# Patient Record
Sex: FEMALE | Race: ASIAN | NOT HISPANIC OR LATINO
[De-identification: names, ages, dates, MRNs, and addresses within clinical notes are randomized per-mention and may not be internally consistent; named-entity substitution may affect disease eponyms.]

---

## 2017-01-26 ENCOUNTER — RESULT REVIEW (OUTPATIENT)
Age: 67
End: 2017-01-26

## 2021-10-04 ENCOUNTER — APPOINTMENT (OUTPATIENT)
Dept: SURGERY | Facility: CLINIC | Age: 71
End: 2021-10-04
Payer: MEDICARE

## 2021-10-04 VITALS
SYSTOLIC BLOOD PRESSURE: 126 MMHG | HEART RATE: 58 BPM | TEMPERATURE: 97.6 F | HEIGHT: 62 IN | WEIGHT: 118 LBS | BODY MASS INDEX: 21.71 KG/M2 | DIASTOLIC BLOOD PRESSURE: 77 MMHG

## 2021-10-04 DIAGNOSIS — T81.89XA OTHER COMPLICATIONS OF PROCEDURES, NOT ELSEWHERE CLASSIFIED, INITIAL ENCOUNTER: ICD-10-CM

## 2021-10-04 PROCEDURE — 99202 OFFICE O/P NEW SF 15 MIN: CPT

## 2021-10-04 NOTE — HISTORY OF PRESENT ILLNESS
[de-identified] : 70 yo female with h/o lap randy several years ago as well as laps BSO presents today with c/o foul smelling drainage from umbilicus. She states surrounding skin becomes irritated as well and she has been placed on Keflex in the past. She also c/o some periumbilical discomfort at times.

## 2021-10-04 NOTE — PHYSICAL EXAM
[Respiratory Effort] : normal respiratory effort [Alert] : alert [Oriented to Person] : oriented to person [Oriented to Place] : oriented to place [Oriented to Time] : oriented to time [Calm] : calm [JVD] : no jugular venous distention  [Abdominal Masses] : No abdominal masses [Abdomen Tenderness] : ~T ~M No abdominal tenderness [de-identified] : EVIE YOO NAD [de-identified] : EOMI [de-identified] : soft NT ND + suture granuloma palpated deep to umbilicus

## 2021-10-04 NOTE — REASON FOR VISIT
[Consultation] : a consultation visit [Spouse] : spouse [FreeTextEntry1] : intermittent drainage from umbilicus

## 2021-10-04 NOTE — ASSESSMENT
[FreeTextEntry1] : 70 yo female with a symptomatic suture granuloma. Will schedule for out patient exploration and removal of suture.

## 2021-10-19 ENCOUNTER — OUTPATIENT (OUTPATIENT)
Dept: OUTPATIENT SERVICES | Facility: HOSPITAL | Age: 71
LOS: 1 days | Discharge: HOME | End: 2021-10-19
Payer: MEDICARE

## 2021-10-19 VITALS
SYSTOLIC BLOOD PRESSURE: 115 MMHG | DIASTOLIC BLOOD PRESSURE: 70 MMHG | TEMPERATURE: 98 F | RESPIRATION RATE: 16 BRPM | OXYGEN SATURATION: 99 % | HEART RATE: 75 BPM | HEIGHT: 62 IN | WEIGHT: 115.96 LBS

## 2021-10-19 DIAGNOSIS — I10 ESSENTIAL (PRIMARY) HYPERTENSION: ICD-10-CM

## 2021-10-19 DIAGNOSIS — Z90.721 ACQUIRED ABSENCE OF OVARIES, UNILATERAL: Chronic | ICD-10-CM

## 2021-10-19 DIAGNOSIS — T81.89XA OTHER COMPLICATIONS OF PROCEDURES, NOT ELSEWHERE CLASSIFIED, INITIAL ENCOUNTER: ICD-10-CM

## 2021-10-19 DIAGNOSIS — Z98.890 OTHER SPECIFIED POSTPROCEDURAL STATES: Chronic | ICD-10-CM

## 2021-10-19 DIAGNOSIS — E78.5 HYPERLIPIDEMIA, UNSPECIFIED: ICD-10-CM

## 2021-10-19 DIAGNOSIS — H40.9 UNSPECIFIED GLAUCOMA: ICD-10-CM

## 2021-10-19 DIAGNOSIS — Z01.818 ENCOUNTER FOR OTHER PREPROCEDURAL EXAMINATION: ICD-10-CM

## 2021-10-19 LAB
ANION GAP SERPL CALC-SCNC: 5 MMOL/L — SIGNIFICANT CHANGE UP (ref 5–17)
BUN SERPL-MCNC: 11 MG/DL — SIGNIFICANT CHANGE UP (ref 7–23)
CALCIUM SERPL-MCNC: 9.5 MG/DL — SIGNIFICANT CHANGE UP (ref 8.5–10.1)
CHLORIDE SERPL-SCNC: 107 MMOL/L — SIGNIFICANT CHANGE UP (ref 96–108)
CO2 SERPL-SCNC: 27 MMOL/L — SIGNIFICANT CHANGE UP (ref 22–31)
CREAT SERPL-MCNC: 0.84 MG/DL — SIGNIFICANT CHANGE UP (ref 0.5–1.3)
GLUCOSE SERPL-MCNC: 104 MG/DL — HIGH (ref 70–99)
HCT VFR BLD CALC: 38.3 % — SIGNIFICANT CHANGE UP (ref 34.5–45)
HGB BLD-MCNC: 12.4 G/DL — SIGNIFICANT CHANGE UP (ref 11.5–15.5)
MCHC RBC-ENTMCNC: 30.1 PG — SIGNIFICANT CHANGE UP (ref 27–34)
MCHC RBC-ENTMCNC: 32.4 GM/DL — SIGNIFICANT CHANGE UP (ref 32–36)
MCV RBC AUTO: 93 FL — SIGNIFICANT CHANGE UP (ref 80–100)
NRBC # BLD: 0 /100 WBCS — SIGNIFICANT CHANGE UP (ref 0–0)
PLATELET # BLD AUTO: 170 K/UL — SIGNIFICANT CHANGE UP (ref 150–400)
POTASSIUM SERPL-MCNC: 4.4 MMOL/L — SIGNIFICANT CHANGE UP (ref 3.5–5.3)
POTASSIUM SERPL-SCNC: 4.4 MMOL/L — SIGNIFICANT CHANGE UP (ref 3.5–5.3)
RBC # BLD: 4.12 M/UL — SIGNIFICANT CHANGE UP (ref 3.8–5.2)
RBC # FLD: 13.5 % — SIGNIFICANT CHANGE UP (ref 10.3–14.5)
SODIUM SERPL-SCNC: 139 MMOL/L — SIGNIFICANT CHANGE UP (ref 135–145)
WBC # BLD: 4.89 K/UL — SIGNIFICANT CHANGE UP (ref 3.8–10.5)
WBC # FLD AUTO: 4.89 K/UL — SIGNIFICANT CHANGE UP (ref 3.8–10.5)

## 2021-10-19 PROCEDURE — 93010 ELECTROCARDIOGRAM REPORT: CPT

## 2021-10-19 RX ORDER — SODIUM CHLORIDE 9 MG/ML
3 INJECTION INTRAMUSCULAR; INTRAVENOUS; SUBCUTANEOUS EVERY 8 HOURS
Refills: 0 | Status: DISCONTINUED | OUTPATIENT
Start: 2021-10-28 | End: 2021-10-28

## 2021-10-19 NOTE — H&P PST ADULT - PROBLEM SELECTOR PLAN 1
Excision of umbilical suture granuloma  Pre-op instructions given by PA, patient verbalized understanding  Chlorhexidine wash instructions given   medical clearance

## 2021-10-19 NOTE — H&P PST ADULT - PROBLEM/PLAN-1
Clear bilaterally, pupils equal, round and reactive to light. +left upper lid swelling and redness DISPLAY PLAN FREE TEXT

## 2021-10-19 NOTE — H&P PST ADULT - NSICDXFAMILYHX_GEN_ALL_CORE_FT
FAMILY HISTORY:  Father  Still living? No  Family history of asthma, Age at diagnosis: Age Unknown    Mother  Still living? No  Family history of liver cancer, Age at diagnosis: Age Unknown

## 2021-10-19 NOTE — H&P PST ADULT - NSICDXPASTMEDICALHX_GEN_ALL_CORE_FT
PAST MEDICAL HISTORY:  Bunion     Glaucoma     Hypercholesteremia     Hypertension     Osteoporosis     Wrist fracture, right s/p fall 1/13

## 2021-10-19 NOTE — H&P PST ADULT - ASSESSMENT
71 F with PMHx of HTN, HLD, glaucoma, osteoporosis, scoliosis c/o abdominal pain and foul smelling discharge from umbilicus (treated with keflex) found to have complication of procedure here for PST for scheduled excision of umbilical suture granuloma  CAPRINI SCORE    AGE RELATED RISK FACTORS                                                       MOBILITY RELATED FACTORS  [ ] Age 41-60 years                                            (1 Point)                  [ ] Bed rest                                                        (1 Point)  [x ] Age: 61-74 years                                           (2 Points)                [ ] Plaster cast                                                   (2 Points)  [ ] Age= 75 years                                              (3 Points)                 [ ] Bed bound for more than 72 hours                   (2 Points)    DISEASE RELATED RISK FACTORS                                               GENDER SPECIFIC FACTORS  [ ] Edema in the lower extremities                       (1 Point)                  [ ] Pregnancy                                                     (1 Point)  [ ] Varicose veins                                               (1 Point)                  [ ] Post-partum < 6 weeks                                   (1 Point)             [ ] BMI > 25 Kg/m2                                            (1 Point)                  [ ] Hormonal therapy  or oral contraception            (1 Point)                 [ ] Sepsis (in the previous month)                        (1 Point)                  [ ] History of pregnancy complications  [ ] Pneumonia or serious lung disease                                               [ ] Unexplained or recurrent                       (1 Point)           (in the previous month)                               (1 Point)  [ ] Abnormal pulmonary function test                     (1 Point)                 SURGERY RELATED RISK FACTORS  [ ] Acute myocardial infarction                              (1 Point)                 [ ]  Section                                            (1 Point)  [ ] Congestive heart failure (in the previous month)  (1 Point)                 [ ] Minor surgery                                                 (1 Point)   [ ] Inflammatory bowel disease                             (1 Point)                 [ ] Arthroscopic surgery                                        (2 Points)  [ ] Central venous access                                    (2 Points)                [ x] General surgery lasting more than 45 minutes   (2 Points)       [ ] Stroke (in the previous month)                          (5 Points)               [ ] Elective arthroplasty                                        (5 Points)                                                                                                                                               HEMATOLOGY RELATED FACTORS                                                 TRAUMA RELATED RISK FACTORS  [ ] Prior episodes of VTE                                     (3 Points)                 [ ] Fracture of the hip, pelvis, or leg                       (5 Points)  [ ] Positive family history for VTE                         (3 Points)                 [ ] Acute spinal cord injury (in the previous month)  (5 Points)  [ ] Prothrombin 81344 A                                      (3 Points)                 [ ] Paralysis  (less than 1 month)                          (5 Points)  [ ] Factor V Leiden                                             (3 Points)                 [ ] Multiple Trauma within 1 month                         (5 Points)  [ ] Lupus anticoagulants                                     (3 Points)                                                           [ ] Anticardiolipin antibodies                                (3 Points)                                                       [ ] High homocysteine in the blood                      (3 Points)                                             [ ] Other congenital or acquired thrombophilia       (3 Points)                                                [ ] Heparin induced thrombocytopenia                  (3 Points)                                          Total Score [      4  ]

## 2021-10-19 NOTE — H&P PST ADULT - NSICDXPASTSURGICALHX_GEN_ALL_CORE_FT
PAST SURGICAL HISTORY:  Bunion     S/P cholecystectomy     S/P removal of left ovary     S/P removal of right ovary

## 2021-10-19 NOTE — H&P PST ADULT - HISTORY OF PRESENT ILLNESS
63yo F with PMHx of HTN, HLD,  71 F with PMHx of HTN, HLD, glaucoma, osteoporosis, scoliosis c/o abdominal pain and foul smelling discharge from umbilicus (treated with keflex) found to have complication of procedure here for PST for scheduled excision of umbilical suture granuloma    this patient denies any fever, cough, sob, flu like symptoms or travel outside of the [place] in the past 30 days  COVID vaccination series completed

## 2021-10-24 DIAGNOSIS — Z01.818 ENCOUNTER FOR OTHER PREPROCEDURAL EXAMINATION: ICD-10-CM

## 2021-10-25 ENCOUNTER — APPOINTMENT (OUTPATIENT)
Dept: DISASTER EMERGENCY | Facility: CLINIC | Age: 71
End: 2021-10-25

## 2021-10-26 LAB — SARS-COV-2 N GENE NPH QL NAA+PROBE: NOT DETECTED

## 2021-10-27 ENCOUNTER — TRANSCRIPTION ENCOUNTER (OUTPATIENT)
Age: 71
End: 2021-10-27

## 2021-10-28 ENCOUNTER — APPOINTMENT (OUTPATIENT)
Dept: SURGERY | Facility: HOSPITAL | Age: 71
End: 2021-10-28

## 2021-10-28 ENCOUNTER — OUTPATIENT (OUTPATIENT)
Dept: OUTPATIENT SERVICES | Facility: HOSPITAL | Age: 71
LOS: 1 days | Discharge: HOME | End: 2021-10-28
Payer: MEDICARE

## 2021-10-28 VITALS
OXYGEN SATURATION: 99 % | SYSTOLIC BLOOD PRESSURE: 131 MMHG | HEART RATE: 66 BPM | HEIGHT: 62 IN | DIASTOLIC BLOOD PRESSURE: 77 MMHG | TEMPERATURE: 98 F | RESPIRATION RATE: 18 BRPM | WEIGHT: 115.96 LBS

## 2021-10-28 VITALS
RESPIRATION RATE: 16 BRPM | HEART RATE: 62 BPM | OXYGEN SATURATION: 98 % | SYSTOLIC BLOOD PRESSURE: 137 MMHG | DIASTOLIC BLOOD PRESSURE: 71 MMHG

## 2021-10-28 DIAGNOSIS — Z90.721 ACQUIRED ABSENCE OF OVARIES, UNILATERAL: Chronic | ICD-10-CM

## 2021-10-28 PROCEDURE — 15851 REMOVAL SUTR/STAPLE REQ ANES: CPT | Mod: GC

## 2021-10-28 PROCEDURE — 49250 EXCISION OF UMBILICUS: CPT | Mod: GC

## 2021-10-28 RX ORDER — FENTANYL CITRATE 50 UG/ML
25 INJECTION INTRAVENOUS
Refills: 0 | Status: DISCONTINUED | OUTPATIENT
Start: 2021-10-28 | End: 2021-10-28

## 2021-10-28 RX ORDER — LATANOPROST 0.05 MG/ML
1 SOLUTION/ DROPS OPHTHALMIC; TOPICAL
Qty: 0 | Refills: 0 | DISCHARGE

## 2021-10-28 RX ORDER — METOCLOPRAMIDE HCL 10 MG
10 TABLET ORAL ONCE
Refills: 0 | Status: DISCONTINUED | OUTPATIENT
Start: 2021-10-28 | End: 2021-10-28

## 2021-10-28 RX ORDER — SODIUM CHLORIDE 9 MG/ML
1000 INJECTION, SOLUTION INTRAVENOUS
Refills: 0 | Status: DISCONTINUED | OUTPATIENT
Start: 2021-10-28 | End: 2021-10-28

## 2021-10-28 RX ADMIN — FENTANYL CITRATE 25 MICROGRAM(S): 50 INJECTION INTRAVENOUS at 16:10

## 2021-10-28 RX ADMIN — SODIUM CHLORIDE 65 MILLILITER(S): 9 INJECTION, SOLUTION INTRAVENOUS at 15:18

## 2021-10-28 RX ADMIN — FENTANYL CITRATE 25 MICROGRAM(S): 50 INJECTION INTRAVENOUS at 16:25

## 2021-10-28 NOTE — BRIEF OPERATIVE NOTE - NSICDXBRIEFPROCEDURE_GEN_ALL_CORE_FT
PROCEDURES:  Excision, excess skin, abdomen 28-Oct-2021 15:20:29 scar tissue excision in the umbilicus Wellington Morales

## 2021-10-28 NOTE — ASU DISCHARGE PLAN (ADULT/PEDIATRIC) - ASU DC SPECIAL INSTRUCTIONSFT
Please follow up with Dr. Vargas in 2 weeks. Please call his office to make/confirm your appointment    Please take Tylenol 1000mg q6h and Motrin 400mg q6h, but alternate it so that you're taking pain medication every 3 hours.    Please remove the outer dressing after 72 hours

## 2021-10-28 NOTE — ASU DISCHARGE PLAN (ADULT/PEDIATRIC) - CALL YOUR DOCTOR IF YOU HAVE ANY OF THE FOLLOWING:
Bleeding that does not stop/Swelling that gets worse/Excessive diarrhea/Inability to tolerate liquids or foods

## 2021-10-28 NOTE — ASU DISCHARGE PLAN (ADULT/PEDIATRIC) - CARE PROVIDER_API CALL
Jose Guadalupe Vargas)  ColonRectal Surgery; Surgery  [address] [place] [address]  Phone: [phone]  Fax: [phone]  Follow Up Time:

## 2021-11-04 DIAGNOSIS — T81.9XXA UNSPECIFIED COMPLICATION OF PROCEDURE, INITIAL ENCOUNTER: ICD-10-CM

## 2021-11-04 DIAGNOSIS — I10 ESSENTIAL (PRIMARY) HYPERTENSION: ICD-10-CM
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
## 2021-11-04 DIAGNOSIS — M81.0 AGE-RELATED OSTEOPOROSIS WITHOUT CURRENT PATHOLOGICAL FRACTURE: ICD-10-CM

## 2021-11-04 DIAGNOSIS — E78.00 PURE HYPERCHOLESTEROLEMIA, UNSPECIFIED: ICD-10-CM

## 2021-11-15 ENCOUNTER — APPOINTMENT (OUTPATIENT)
Dept: SURGERY | Facility: CLINIC | Age: 71
End: 2021-11-15
Payer: MEDICARE

## 2021-11-15 VITALS
DIASTOLIC BLOOD PRESSURE: 76 MMHG | BODY MASS INDEX: 21.71 KG/M2 | SYSTOLIC BLOOD PRESSURE: 125 MMHG | HEIGHT: 62 IN | HEART RATE: 61 BPM | TEMPERATURE: 97.6 F | WEIGHT: 118 LBS

## 2021-11-15 DIAGNOSIS — Z09 ENCOUNTER FOR FOLLOW-UP EXAMINATION AFTER COMPLETED TREATMENT FOR CONDITIONS OTHER THAN MALIGNANT NEOPLASM: ICD-10-CM

## 2021-11-15 PROCEDURE — 99024 POSTOP FOLLOW-UP VISIT: CPT

## 2021-11-15 NOTE — ASSESSMENT
[FreeTextEntry1] : patient is well, states drainage has stopped.\par \par \par \par \par incision is c/d/i healing well\par \par \par \par f/u prn

## 2022-03-10 ENCOUNTER — INPATIENT (INPATIENT)
Facility: HOSPITAL | Age: 72
LOS: 1 days | Discharge: HOME | End: 2022-03-12
Attending: HOSPITALIST | Admitting: HOSPITALIST
Payer: MEDICARE

## 2022-03-10 VITALS
HEIGHT: 62 IN | DIASTOLIC BLOOD PRESSURE: 70 MMHG | TEMPERATURE: 98 F | SYSTOLIC BLOOD PRESSURE: 136 MMHG | OXYGEN SATURATION: 100 % | RESPIRATION RATE: 18 BRPM | HEART RATE: 61 BPM

## 2022-03-10 DIAGNOSIS — Z90.721 ACQUIRED ABSENCE OF OVARIES, UNILATERAL: Chronic | ICD-10-CM

## 2022-03-10 LAB
ALBUMIN SERPL ELPH-MCNC: 4.5 G/DL — SIGNIFICANT CHANGE UP (ref 3.3–5)
ALP SERPL-CCNC: 39 U/L — LOW (ref 40–120)
ALT FLD-CCNC: 15 U/L — SIGNIFICANT CHANGE UP (ref 4–33)
ANION GAP SERPL CALC-SCNC: 12 MMOL/L — SIGNIFICANT CHANGE UP (ref 7–14)
APPEARANCE UR: CLEAR — SIGNIFICANT CHANGE UP
AST SERPL-CCNC: 27 U/L — SIGNIFICANT CHANGE UP (ref 4–32)
BASOPHILS # BLD AUTO: 0.02 K/UL — SIGNIFICANT CHANGE UP (ref 0–0.2)
BASOPHILS NFR BLD AUTO: 0.4 % — SIGNIFICANT CHANGE UP (ref 0–2)
BILIRUB SERPL-MCNC: 0.4 MG/DL — SIGNIFICANT CHANGE UP (ref 0.2–1.2)
BILIRUB UR-MCNC: NEGATIVE — SIGNIFICANT CHANGE UP
BUN SERPL-MCNC: 9 MG/DL — SIGNIFICANT CHANGE UP (ref 7–23)
CALCIUM SERPL-MCNC: 9.5 MG/DL — SIGNIFICANT CHANGE UP (ref 8.4–10.5)
CHLORIDE SERPL-SCNC: 107 MMOL/L — SIGNIFICANT CHANGE UP (ref 98–107)
CO2 SERPL-SCNC: 24 MMOL/L — SIGNIFICANT CHANGE UP (ref 22–31)
COLOR SPEC: COLORLESS — SIGNIFICANT CHANGE UP
CREAT SERPL-MCNC: 0.77 MG/DL — SIGNIFICANT CHANGE UP (ref 0.5–1.3)
DIFF PNL FLD: NEGATIVE — SIGNIFICANT CHANGE UP
EGFR: 82 ML/MIN/1.73M2 — SIGNIFICANT CHANGE UP
EOSINOPHIL # BLD AUTO: 0.03 K/UL — SIGNIFICANT CHANGE UP (ref 0–0.5)
EOSINOPHIL NFR BLD AUTO: 0.6 % — SIGNIFICANT CHANGE UP (ref 0–6)
FLUAV AG NPH QL: SIGNIFICANT CHANGE UP
FLUBV AG NPH QL: SIGNIFICANT CHANGE UP
GLUCOSE SERPL-MCNC: 92 MG/DL — SIGNIFICANT CHANGE UP (ref 70–99)
GLUCOSE UR QL: NEGATIVE — SIGNIFICANT CHANGE UP
HCT VFR BLD CALC: 35.7 % — SIGNIFICANT CHANGE UP (ref 34.5–45)
HGB BLD-MCNC: 11.8 G/DL — SIGNIFICANT CHANGE UP (ref 11.5–15.5)
IANC: 2.91 K/UL — SIGNIFICANT CHANGE UP (ref 1.5–8.5)
IMM GRANULOCYTES NFR BLD AUTO: 0.2 % — SIGNIFICANT CHANGE UP (ref 0–1.5)
KETONES UR-MCNC: NEGATIVE — SIGNIFICANT CHANGE UP
LEUKOCYTE ESTERASE UR-ACNC: NEGATIVE — SIGNIFICANT CHANGE UP
LYMPHOCYTES # BLD AUTO: 1.45 K/UL — SIGNIFICANT CHANGE UP (ref 1–3.3)
LYMPHOCYTES # BLD AUTO: 30.9 % — SIGNIFICANT CHANGE UP (ref 13–44)
MCHC RBC-ENTMCNC: 30.9 PG — SIGNIFICANT CHANGE UP (ref 27–34)
MCHC RBC-ENTMCNC: 33.1 GM/DL — SIGNIFICANT CHANGE UP (ref 32–36)
MCV RBC AUTO: 93.5 FL — SIGNIFICANT CHANGE UP (ref 80–100)
MONOCYTES # BLD AUTO: 0.28 K/UL — SIGNIFICANT CHANGE UP (ref 0–0.9)
MONOCYTES NFR BLD AUTO: 6 % — SIGNIFICANT CHANGE UP (ref 2–14)
NEUTROPHILS # BLD AUTO: 2.91 K/UL — SIGNIFICANT CHANGE UP (ref 1.8–7.4)
NEUTROPHILS NFR BLD AUTO: 61.9 % — SIGNIFICANT CHANGE UP (ref 43–77)
NITRITE UR-MCNC: NEGATIVE — SIGNIFICANT CHANGE UP
NRBC # BLD: 0 /100 WBCS — SIGNIFICANT CHANGE UP
NRBC # FLD: 0 K/UL — SIGNIFICANT CHANGE UP
PH UR: 6.5 — SIGNIFICANT CHANGE UP (ref 5–8)
PLATELET # BLD AUTO: 162 K/UL — SIGNIFICANT CHANGE UP (ref 150–400)
POTASSIUM SERPL-MCNC: 4.3 MMOL/L — SIGNIFICANT CHANGE UP (ref 3.5–5.3)
POTASSIUM SERPL-SCNC: 4.3 MMOL/L — SIGNIFICANT CHANGE UP (ref 3.5–5.3)
PROT SERPL-MCNC: 7 G/DL — SIGNIFICANT CHANGE UP (ref 6–8.3)
PROT UR-MCNC: NEGATIVE — SIGNIFICANT CHANGE UP
RBC # BLD: 3.82 M/UL — SIGNIFICANT CHANGE UP (ref 3.8–5.2)
RBC # FLD: 13.6 % — SIGNIFICANT CHANGE UP (ref 10.3–14.5)
RSV RNA NPH QL NAA+NON-PROBE: SIGNIFICANT CHANGE UP
SARS-COV-2 RNA SPEC QL NAA+PROBE: SIGNIFICANT CHANGE UP
SODIUM SERPL-SCNC: 143 MMOL/L — SIGNIFICANT CHANGE UP (ref 135–145)
SP GR SPEC: 1.01 — SIGNIFICANT CHANGE UP (ref 1–1.05)
UROBILINOGEN FLD QL: SIGNIFICANT CHANGE UP
WBC # BLD: 4.7 K/UL — SIGNIFICANT CHANGE UP (ref 3.8–10.5)
WBC # FLD AUTO: 4.7 K/UL — SIGNIFICANT CHANGE UP (ref 3.8–10.5)

## 2022-03-10 PROCEDURE — 72100 X-RAY EXAM L-S SPINE 2/3 VWS: CPT | Mod: 26

## 2022-03-10 PROCEDURE — 73502 X-RAY EXAM HIP UNI 2-3 VIEWS: CPT | Mod: 26,RT

## 2022-03-10 PROCEDURE — 99285 EMERGENCY DEPT VISIT HI MDM: CPT

## 2022-03-10 PROCEDURE — 74177 CT ABD & PELVIS W/CONTRAST: CPT | Mod: 26,MA

## 2022-03-10 RX ORDER — FAMOTIDINE 10 MG/ML
20 INJECTION INTRAVENOUS ONCE
Refills: 0 | Status: COMPLETED | OUTPATIENT
Start: 2022-03-10 | End: 2022-03-10

## 2022-03-10 RX ORDER — SODIUM CHLORIDE 9 MG/ML
1000 INJECTION INTRAMUSCULAR; INTRAVENOUS; SUBCUTANEOUS ONCE
Refills: 0 | Status: COMPLETED | OUTPATIENT
Start: 2022-03-10 | End: 2022-03-10

## 2022-03-10 RX ORDER — OXYCODONE HYDROCHLORIDE 5 MG/1
5 TABLET ORAL ONCE
Refills: 0 | Status: DISCONTINUED | OUTPATIENT
Start: 2022-03-10 | End: 2022-03-10

## 2022-03-10 RX ORDER — LIDOCAINE 4 G/100G
1 CREAM TOPICAL ONCE
Refills: 0 | Status: COMPLETED | OUTPATIENT
Start: 2022-03-10 | End: 2022-03-10

## 2022-03-10 RX ORDER — KETOROLAC TROMETHAMINE 30 MG/ML
15 SYRINGE (ML) INJECTION ONCE
Refills: 0 | Status: DISCONTINUED | OUTPATIENT
Start: 2022-03-10 | End: 2022-03-10

## 2022-03-10 RX ADMIN — SODIUM CHLORIDE 1000 MILLILITER(S): 9 INJECTION INTRAMUSCULAR; INTRAVENOUS; SUBCUTANEOUS at 19:19

## 2022-03-10 RX ADMIN — FAMOTIDINE 20 MILLIGRAM(S): 10 INJECTION INTRAVENOUS at 15:47

## 2022-03-10 RX ADMIN — OXYCODONE HYDROCHLORIDE 5 MILLIGRAM(S): 5 TABLET ORAL at 17:58

## 2022-03-10 RX ADMIN — OXYCODONE HYDROCHLORIDE 5 MILLIGRAM(S): 5 TABLET ORAL at 22:39

## 2022-03-10 RX ADMIN — LIDOCAINE 1 PATCH: 4 CREAM TOPICAL at 15:47

## 2022-03-10 RX ADMIN — Medication 15 MILLIGRAM(S): at 15:47

## 2022-03-10 NOTE — ED PROVIDER NOTE - CLINICAL SUMMARY MEDICAL DECISION MAKING FREE TEXT BOX
72 y/o F c/o right hip/flank pain x 4 days, now radiating into abdomen and down right leg, a/w dysuria x 1 day. Order CBC/CMP, UA, and UCx to r/o UTI/pyelo. Right hip XR and LSpine XR to r/o fractures/herniations. Pain control with toradol, pepcid, and lidocaine patches. Will continue to monitor and reassess following labs/imaging.

## 2022-03-10 NOTE — ED PROVIDER NOTE - PROGRESS NOTE DETAILS
Supervising Statement (CONNOR Aguirre, PA-C): I have personally seen and examined this patient.  I have fully participated in the care of this patient. I have reviewed all pertinent clinical information, including history, physical exam, plan and PA student’s note and agree as noted. NILESH Aguirre: Pt reassessed, still has significant pain despite pain meds. Labs/ua unremarkable. Plan to do ct abdomen to r/o intra abdominal pathology, if negative plan for CDU for MRI L spine/hip to eval atraumatic hip pain Patient able to ambulate slowly with some pain. Will get CT hip to look for occult fracture. CDU vs Admit. Pebbles Conway PGY1: I received sign out on this patient. I have reassessed patient and agree with the current plan. Will follow-up labs/imaging. Pebbles Conway MD PGY1: Patient able to ambulate slowly with some pain. Will get CT hip to look for occult fracture. CDU vs Admit. CT A/P read right inguinal fat containing hernia, pateint was reexamined. No signs of erythema, pain, or bulging on exam. Do not suspect incarnated hernia. Unlikely source of patient's pain. Patient points to back of left buttocks as site of worst pain, worse with palpation. Patient also with pain on internal and external rotation at the hip. Pebbles Conway MD PGY1: CT tech states back up in list for patients to scanner. Patient still has several scans ahead of her. Patient remains asleep in bed. Gave 500 mL fluids due to soft BP. Will reassess after fluids.

## 2022-03-10 NOTE — ED PROVIDER NOTE - ATTENDING CONTRIBUTION TO CARE
Randy Carpenter DO:  patient seen and evaluated with the PA and student.  I was present for key portions of the History & Physical, and I agree with the Impression & Plan. 70 yo f pmh HTN, HLD, glaucoma, osteoporosis, scoliosis, pw bp. reports r hip pain, radiates down leg and across ant abd, constant, worse w/ movement. denies f/c, cp, sob, cough, congestion ha. denies hx malignancy, trauma, iv drug use, bowel/bladder incontinence, saddle anesthesia. reports she is under a lot of stress and delayed this visit because taking care of  w/ AML. pt arrives hds, well appearing, ambulatory. neuro intact. mild pain in r hip. do not suspect fx/dislocation. unlikely acute abd surgical emergency. abd soft. suspect disc herniation. do not suspect central cord, cauda equina. will check labs, urine, treat sx, reassess. Randy Carpenter DO:  patient seen and evaluated with the PA and student.  I was present for key portions of the History & Physical, and I agree with the Impression & Plan. 70 yo f pmh HTN, HLD, glaucoma, osteoporosis, scoliosis, pw bp. reports r hip pain, radiates down leg and across ant abd, constant, worse w/ movement. denies f/c, cp, sob, cough, congestion ha. denies hx malignancy, trauma, iv drug use, bowel/bladder incontinence, saddle anesthesia. reports she is under a lot of stress and delayed this visit because taking care of  w/ AML. pt arrives hds, well appearing, ambulatory. neuro intact. mild pain in r hip. do not suspect fx/dislocation. unlikely acute abd surgical emergency. abd soft. suspect disc herniation. do not suspect central cord, cauda equina. will check labs, urine, treat sx, reassess..

## 2022-03-10 NOTE — ED ADULT TRIAGE NOTE - CHIEF COMPLAINT QUOTE
Pt c/o right hip pain x4 days that got worse last night.  Denies trauma.  Hx HTN HLD glaucoma.  Taking a muscle relaxer with no relief.

## 2022-03-10 NOTE — ED PROVIDER NOTE - CONSTITUTIONAL, MLM
normal... Apparent distress secondary to pain. Awake, alert, oriented to person, place, time/situation

## 2022-03-10 NOTE — ED PROVIDER NOTE - MUSCULOSKELETAL MINIMAL EXAM
+posterior R hip tenderness to palpation, +pain with axial load, no pain with ROM, +pain in R flank +posterior R hip tenderness to palpation, + hip pain with axial load, no pain with hip ROM/RANGE OF MOTION LIMITED/neck supple/motor intact/TENDERNESS

## 2022-03-10 NOTE — ED ADULT NURSE NOTE - OBJECTIVE STATEMENT
71 year old female received to rm 26 AAOx4 ambulatory at baseline. Pt c/o right flank and pelvic pain x4 days intermittent. Pain worsened this morning and became constant. Pt unable to ambulate today due to pain. Pt endorses burning with urination. Pt taking tylenol, bengay and muscle relaxer without relief. Pt denies headache, dizziness, chest pain, n/v/d, fever, chills. Respirations even and unlabored. Skin intact. PIV #20 right AC, labs drawn and sent. UA sent. VS as noted. Bed in lowest position, call bell within reach. Pt awaiting XR results.

## 2022-03-10 NOTE — ED PROVIDER NOTE - ABDOMINAL EXAM
+suprapubic tenderness to palpation, no guarding or rebound tenderness, no CVA tenderness, soft, non-distended soft/tender.../nondistended

## 2022-03-10 NOTE — ED PROVIDER NOTE - OBJECTIVE STATEMENT
70 y/o F PMH HTN, HLD, glaucoma, osteoporosis, scoliosis presents with worsening, constant, 10/10 atraumatic R hip pain x 4 days. Today pain started radiating down leg and across anterior abdomen, and is worse with movement. She reports using tizanidine, tylenol, bengay and heating pads with no relief. Pt reports sitting in a chair for a prolonged period of time when pain first began, worsening it. Pt also notes dysuria since yesterday. Denies fever, chills, HA, CP, SOB, N/V/D, constipation, hematuria, extremity numbness/weakness/tingling, bowel/bladder incontinence, saddle anesthesia, hx of previous hip fx, or malignancy. 72 y/o F PMH HTN, HLD, glaucoma, osteoporosis, scoliosis presents with worsening, constant, 10/10 atraumatic R hip pain x 4 days. Today pain started radiating down leg and across anterior abdomen, and is worse with movement. She reports using tizanidine, tylenol, bengay and heating pads with no relief. Pt reports sitting in a chair for a prolonged period of time when pain first began, worsening it. Pt also notes dysuria since yesterday. Denies fever, chills, HA, CP, SOB, N/V/D, constipation, hematuria, extremity numbness/weakness/tingling, bowel/bladder incontinence, saddle anesthesia, hx of previous hip fx, or malignancy. O fnote, patient has been caring for her  who has been diagnosed w/ acute leukemia, but denies strenuous activity.

## 2022-03-10 NOTE — ED PROVIDER NOTE - NSFOLLOWUPINSTRUCTIONS_ED_ALL_ED_FT
We've got you covered from head to toe    Our specialty programs:    Sp[facility]  [phone]  [email or web address]    [facility]  [phone]  [email or web address]    Sports Program  [phone]  [email or web address]    Concussion Program  [phone]  [email or web address]     (Scheduling) team hours of operation:  Monday through Thursday, 7am to 8:30pm  Friday, 7am to 7pm  Saturday, 8am to 4pm    ARTHRITIS   WHAT YOU NEED TO KNOW:  Arthritis is pain or disease in one or more joints. There are many types of arthritis. Types such as rheumatoid arthritis cause inflammation in the joints. Other types wear away the cartilage between joints, such as osteoarthritis. This makes the bones of the joint rub together when you move the joint. An infection from bacteria, a virus, or a fungus can also cause arthritis. Your symptoms may be constant, or symptoms may come and go. Arthritis often gets worse over time and can cause permanent joint damage.  DISCHARGE INSTRUCTIONS:  Call your doctor or rheumatologist if:   •You have a fever and severe joint pain or swelling.  •You cannot move the affected joint.  •You have severe joint pain you cannot tolerate.  •You have a new or worsening rash.  •Your pain or swelling does not get better with treatment.  •You have questions or concerns about your condition or care.  Medicines:   •Acetaminophen decreases pain and fever. It is available without a doctor's order. Ask how much to take and how often to take it. Follow directions. Read the labels of all other medicines you are using to see if they also contain acetaminophen, or ask your doctor or pharmacist. Acetaminophen can cause liver damage if not taken correctly. Do not use more than 4 grams (4,000 milligrams) total of acetaminophen in one day.   •NSAIDs, such as ibuprofen, help decrease swelling, pain, and fever. This medicine is available with or without a doctor's order. NSAIDs can cause stomach bleeding or kidney problems in certain people. If you take blood thinner medicine, always ask your healthcare provider if NSAIDs are safe for you. Always read the medicine label and follow directions  •Steroids reduce swelling and pain.  •Prescription pain medicine may be given. Ask your healthcare provider how to take this medicine safely. Some prescription pain medicines contain acetaminophen. Do not take other medicines that contain acetaminophen without talking to your healthcare provider. Too much acetaminophen may cause liver damage. Prescription pain medicine may cause constipation. Ask your healthcare provider how to prevent or treat constipation.   •Take your medicine as directed. Contact your healthcare provider if you think your medicine is not helping or if you have side effects. Tell him of her if you are allergic to any medicine. Keep a list of the medicines, vitamins, and herbs you take. Include the amounts, and when and why you take them. Bring the list or the pill bottles to follow-up visits. Carry your medicine list with you in case of an emergency.  MANAGE YOUR SYMPTOMS:   •Rest your painful joint so it can heal. Your healthcare provider may recommend crutches or a walker if the affected joint is in a leg.  •Apply ice or heat to the joint. Both can help decrease swelling and pain. Ice may also help prevent tissue damage. Use an ice pack, or put crushed ice in a plastic bag. Cover it with a towel and place it on your joint for 15 to 20 minutes every hour or as directed. You can apply heat for 20 minutes every 2 hours. Heat treatment includes hot packs or heat lamps.  •Elevate your joint. Elevation helps reduce swelling and pain. Raise your joint above the level of your heart as often as you can. Prop your painful joint on pillows to keep it above your heart comfortably.  Elevate Leg  MANAGE ARTHRITIS  •Talk to your healthcare providers about your arthritis medicines. Some medicines may only be needed when you have arthritis pain. You may need to take other medicines every day to prevent arthritis from getting worse. Your healthcare providers will help you understand all your medicines and when to take them. It is important to take the medicines as directed, even if you start to feel better. You can continue to have joint damage and inflammation even if you do not feel it.  •Eat a variety of healthy foods. Healthy foods include fruits, vegetables, whole-grain breads, low-fat dairy products, beans, lean meats, and fish. Ask if you need to be on a special diet. A diet rich in calcium and vitamin D may decrease your risk of osteoporosis. Foods high in calcium include milk, cheese, broccoli, and tofu. Vitamin D may be found in meat, fish, fortified milk, cereal and bread. Ask if you need calcium or vitamin D supplements.   •Go to physical or occupational therapy as directed. A physical therapist can teach you exercises to improve flexibility and range of motion. You may also be shown non-weight-bearing exercises that are safe for your joints, such as swimming. Exercise can help keep your joints flexible and reduce pain. An occupational therapist can help you learn to do your daily activities when your joints are stiff or sore.  •Maintain a healthy weight. Extra weight puts increased pressure on your joints. Ask your healthcare provider what you should weigh. If you need to lose weight, he or she can help you create a weight loss program. Weight loss can help reduce pain and increase your ability to do your activities.  •Wear flat or low-heeled shoes. This will help decrease pain and reduce pressure on your ankle, knee, and hip joints.  •Do not smoke. Nicotine and other chemicals in cigarettes and cigars can damage your bones and joints. Ask your healthcare provider for information if you currently smoke and need help to quit. E-cigarettes or smokeless tobacco still contain nicotine. Talk to your healthcare provider before you use these products.   Support devices:   •Orthotic shoes or insoles help support your feet when you walk.  •Crutches, a cane, or a walker may help decrease your risk for falling. They also decrease stress on affected joints.  •Devices to prevent falls include raised toilet seats and bathtub bars to help you get up from sitting. Handrails can be placed in areas where you need balance and support.  •Devices to help with support and rest include splints to wear on your hands and a firm pillow while you sleep. Use a pillow that is firm enough to support your neck and head.  Follow up with your healthcare provider or rheumatologist as directed: Write down your questions so you remember to ask them during your visits.

## 2022-03-11 DIAGNOSIS — M81.0 AGE-RELATED OSTEOPOROSIS WITHOUT CURRENT PATHOLOGICAL FRACTURE: ICD-10-CM

## 2022-03-11 DIAGNOSIS — Z29.9 ENCOUNTER FOR PROPHYLACTIC MEASURES, UNSPECIFIED: ICD-10-CM

## 2022-03-11 DIAGNOSIS — I10 ESSENTIAL (PRIMARY) HYPERTENSION: ICD-10-CM

## 2022-03-11 DIAGNOSIS — M25.551 PAIN IN RIGHT HIP: ICD-10-CM

## 2022-03-11 DIAGNOSIS — H40.9 UNSPECIFIED GLAUCOMA: ICD-10-CM

## 2022-03-11 DIAGNOSIS — K21.9 GASTRO-ESOPHAGEAL REFLUX DISEASE WITHOUT ESOPHAGITIS: ICD-10-CM

## 2022-03-11 DIAGNOSIS — E78.5 HYPERLIPIDEMIA, UNSPECIFIED: ICD-10-CM

## 2022-03-11 LAB
BASE EXCESS BLDV CALC-SCNC: -1.2 MMOL/L — SIGNIFICANT CHANGE UP (ref -2–3)
BLOOD GAS VENOUS COMPREHENSIVE RESULT: SIGNIFICANT CHANGE UP
CHLORIDE BLDV-SCNC: 107 MMOL/L — SIGNIFICANT CHANGE UP (ref 96–108)
CO2 BLDV-SCNC: 24.8 MMOL/L — SIGNIFICANT CHANGE UP (ref 22–26)
CULTURE RESULTS: SIGNIFICANT CHANGE UP
GAS PNL BLDV: 140 MMOL/L — SIGNIFICANT CHANGE UP (ref 136–145)
GLUCOSE BLDV-MCNC: 77 MG/DL — SIGNIFICANT CHANGE UP (ref 70–99)
HCO3 BLDV-SCNC: 24 MMOL/L — SIGNIFICANT CHANGE UP (ref 22–29)
HCT VFR BLDA CALC: 32 % — LOW (ref 34.5–46.5)
HGB BLD CALC-MCNC: 10.7 G/DL — LOW (ref 11.5–15.5)
LACTATE BLDV-MCNC: 0.8 MMOL/L — SIGNIFICANT CHANGE UP (ref 0.5–2)
PCO2 BLDV: 39 MMHG — SIGNIFICANT CHANGE UP (ref 39–42)
PH BLDV: 7.39 — SIGNIFICANT CHANGE UP (ref 7.32–7.43)
PO2 BLDV: 52 MMHG — SIGNIFICANT CHANGE UP
POTASSIUM BLDV-SCNC: 4 MMOL/L — SIGNIFICANT CHANGE UP (ref 3.5–5.1)
SAO2 % BLDV: 86.5 % — SIGNIFICANT CHANGE UP
SPECIMEN SOURCE: SIGNIFICANT CHANGE UP

## 2022-03-11 PROCEDURE — 99223 1ST HOSP IP/OBS HIGH 75: CPT

## 2022-03-11 PROCEDURE — 76376 3D RENDER W/INTRP POSTPROCES: CPT | Mod: 26,MA

## 2022-03-11 PROCEDURE — 73700 CT LOWER EXTREMITY W/O DYE: CPT | Mod: 26,RT,MA

## 2022-03-11 PROCEDURE — 72192 CT PELVIS W/O DYE: CPT | Mod: 26,MA

## 2022-03-11 RX ORDER — PANTOPRAZOLE SODIUM 20 MG/1
40 TABLET, DELAYED RELEASE ORAL
Refills: 0 | Status: DISCONTINUED | OUTPATIENT
Start: 2022-03-11 | End: 2022-03-12

## 2022-03-11 RX ORDER — LOSARTAN POTASSIUM 100 MG/1
50 TABLET, FILM COATED ORAL DAILY
Refills: 0 | Status: DISCONTINUED | OUTPATIENT
Start: 2022-03-11 | End: 2022-03-12

## 2022-03-11 RX ORDER — ASCORBIC ACID 60 MG
500 TABLET,CHEWABLE ORAL DAILY
Refills: 0 | Status: DISCONTINUED | OUTPATIENT
Start: 2022-03-11 | End: 2022-03-12

## 2022-03-11 RX ORDER — ATORVASTATIN CALCIUM 80 MG/1
20 TABLET, FILM COATED ORAL AT BEDTIME
Refills: 0 | Status: DISCONTINUED | OUTPATIENT
Start: 2022-03-11 | End: 2022-03-12

## 2022-03-11 RX ORDER — ONDANSETRON 8 MG/1
4 TABLET, FILM COATED ORAL EVERY 8 HOURS
Refills: 0 | Status: DISCONTINUED | OUTPATIENT
Start: 2022-03-11 | End: 2022-03-12

## 2022-03-11 RX ORDER — L.ACIDOPH/B.ANIMALIS/B.LONGUM 15B CELL
1 CAPSULE ORAL
Qty: 0 | Refills: 0 | DISCHARGE

## 2022-03-11 RX ORDER — LATANOPROST 0.05 MG/ML
1 SOLUTION/ DROPS OPHTHALMIC; TOPICAL AT BEDTIME
Refills: 0 | Status: DISCONTINUED | OUTPATIENT
Start: 2022-03-11 | End: 2022-03-12

## 2022-03-11 RX ORDER — SODIUM CHLORIDE 9 MG/ML
500 INJECTION INTRAMUSCULAR; INTRAVENOUS; SUBCUTANEOUS ONCE
Refills: 0 | Status: COMPLETED | OUTPATIENT
Start: 2022-03-11 | End: 2022-03-11

## 2022-03-11 RX ORDER — ACETAMINOPHEN 500 MG
975 TABLET ORAL ONCE
Refills: 0 | Status: COMPLETED | OUTPATIENT
Start: 2022-03-11 | End: 2022-03-11

## 2022-03-11 RX ORDER — INFLUENZA VIRUS VACCINE 15; 15; 15; 15 UG/.5ML; UG/.5ML; UG/.5ML; UG/.5ML
0.7 SUSPENSION INTRAMUSCULAR ONCE
Refills: 0 | Status: DISCONTINUED | OUTPATIENT
Start: 2022-03-11 | End: 2022-03-12

## 2022-03-11 RX ORDER — ACETAMINOPHEN 500 MG
650 TABLET ORAL EVERY 6 HOURS
Refills: 0 | Status: DISCONTINUED | OUTPATIENT
Start: 2022-03-11 | End: 2022-03-12

## 2022-03-11 RX ORDER — TIMOLOL 0.5 %
1 DROPS OPHTHALMIC (EYE) DAILY
Refills: 0 | Status: DISCONTINUED | OUTPATIENT
Start: 2022-03-11 | End: 2022-03-12

## 2022-03-11 RX ORDER — ONDANSETRON 8 MG/1
4 TABLET, FILM COATED ORAL ONCE
Refills: 0 | Status: COMPLETED | OUTPATIENT
Start: 2022-03-11 | End: 2022-03-11

## 2022-03-11 RX ORDER — OXYCODONE HYDROCHLORIDE 5 MG/1
5 TABLET ORAL EVERY 6 HOURS
Refills: 0 | Status: DISCONTINUED | OUTPATIENT
Start: 2022-03-11 | End: 2022-03-12

## 2022-03-11 RX ORDER — ENOXAPARIN SODIUM 100 MG/ML
40 INJECTION SUBCUTANEOUS EVERY 24 HOURS
Refills: 0 | Status: DISCONTINUED | OUTPATIENT
Start: 2022-03-11 | End: 2022-03-12

## 2022-03-11 RX ORDER — LIDOCAINE 4 G/100G
1 CREAM TOPICAL DAILY
Refills: 0 | Status: DISCONTINUED | OUTPATIENT
Start: 2022-03-12 | End: 2022-03-12

## 2022-03-11 RX ADMIN — ATORVASTATIN CALCIUM 20 MILLIGRAM(S): 80 TABLET, FILM COATED ORAL at 22:06

## 2022-03-11 RX ADMIN — Medication 975 MILLIGRAM(S): at 02:03

## 2022-03-11 RX ADMIN — SODIUM CHLORIDE 500 MILLILITER(S): 9 INJECTION INTRAMUSCULAR; INTRAVENOUS; SUBCUTANEOUS at 04:41

## 2022-03-11 RX ADMIN — Medication 650 MILLIGRAM(S): at 23:00

## 2022-03-11 RX ADMIN — LATANOPROST 1 DROP(S): 0.05 SOLUTION/ DROPS OPHTHALMIC; TOPICAL at 22:44

## 2022-03-11 RX ADMIN — Medication 500 MILLIGRAM(S): at 14:22

## 2022-03-11 RX ADMIN — ENOXAPARIN SODIUM 40 MILLIGRAM(S): 100 INJECTION SUBCUTANEOUS at 17:47

## 2022-03-11 RX ADMIN — Medication 1 DROP(S): at 22:07

## 2022-03-11 RX ADMIN — ONDANSETRON 4 MILLIGRAM(S): 8 TABLET, FILM COATED ORAL at 07:57

## 2022-03-11 RX ADMIN — Medication 650 MILLIGRAM(S): at 22:06

## 2022-03-11 RX ADMIN — Medication 975 MILLIGRAM(S): at 02:30

## 2022-03-11 NOTE — H&P ADULT - PROBLEM SELECTOR PLAN 1
- Patient c/o back / right hip pain radiating to right thigh  - CT of abdomen, Pelvis, Femur done in ED  - Complaining of lower abdominal pain - CT of Abdomen showed Approximately 2 cm fat-containing direct right inguinal hernia, which may be causing the patient's groin pain.  - CT Pelvis showed No evidence of acute fracture. 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 Consider MRI if clinical suspicion persists for underlying occult pathology.  - Ortho consult if needed  - Added CT of Lumbar spine, if needed MRI of LS spine to r/o spinal stenosis  - PO or IV pain meds for relief with Toradol, Tylenol, Oxycodone, Lidocaine patch  - PPI for abdominal pain  - PT consult - Patient c/o back / right hip pain radiating to right thigh  - CT of abdomen, Pelvis, Femur done in ED  - Complaining of lower abdominal pain - CT of Abdomen showed Approximately 2 cm fat-containing direct right inguinal hernia, which may be causing the patient's groin pain.  - Surgery consulted for inguinal hernia evaluation  - CT Pelvis showed No evidence of acute fracture. 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 Consider MRI if clinical suspicion persists for underlying occult pathology.  - Discussed with Ortho about moderate to severe patellofemoral arthrosis - f/u as outpatient- Treat with NSAID, PT, ICE as needed.  - Added CT of Lumbar spine, if needed MRI of LS spine to r/o spinal stenosis  - PO or IV pain meds for relief with Toradol, Tylenol, Oxycodone, Lidocaine patch  - PPI for abdominal pain  - PT consult

## 2022-03-11 NOTE — CONSULT NOTE ADULT - ASSESSMENT
73yF w/ R groin pain    - Pt w/o clinically evident hernia  - Pt requesting to delay any surgical intervention at this time as she is concerned about caring for her   - Pt can f/u out pt w/ Dr. Jess Michaud for elective hernia repair if she desires. She can call [phone] after discharge to schedule follow up.  - No acute surgical intervention at this time  - Will sign off. Please reconsult PRN.  - Pt discussed w/ Dr. Schwarz  - Please page [phone] w/ any questions

## 2022-03-11 NOTE — PHYSICAL THERAPY INITIAL EVALUATION ADULT - PERTINENT HX OF CURRENT PROBLEM, REHAB EVAL
Pt is a 71 year old female presenting with pain, radiating abdomen to right thigh. CT Pelvis showed no evidence of acute fracture, pending CT lumbar spine r/o stenosis. CT of Abdomen showed approximately 2 cm fat-containing direct right inguinal hernia, Surgery consulted, f/u recs. PMH listed below.

## 2022-03-11 NOTE — PHYSICAL THERAPY INITIAL EVALUATION ADULT - GENERAL OBSERVATIONS, REHAB EVAL
Pt received semisupine in bed, in NAD. Pt agreeable to participate in PT evaluation. Pt was left semisupine in bed as found, all needs met, +bed alarm.

## 2022-03-11 NOTE — H&P ADULT - PROBLEM SELECTOR PLAN 5
Hx of osteoporosis. On Prolia 60 mg SC twice a year  No evidence of fracture on CT of Hip, Pelvis or Femur

## 2022-03-11 NOTE — H&P ADULT - ATTENDING COMMENTS
72 y/o female with PMH of HTN, HLD, GERD, Glaucoma, osteoporosis, scoliosis present to ED complaining of  right hip/flank pain. CT pelvis showing R inguinal hernia and CT femur showing moderate to severe patellofemoral arthrosis. Surgery consulted for hernia. Per ortho, f/u out pt for patellofemoral arthrosis. Will obtain CT L spine to further eval back/flank pain, may need MRI. Consult PT. Pain control with oxycodone and Tylenols prn

## 2022-03-11 NOTE — CONSULT NOTE ADULT - SUBJECTIVE AND OBJECTIVE BOX
General Surgery Consult  Consulting surgical team: [facility] Team (Pager [phone])  Consulting attending: Dr. Schwarz    HPI:  72 y/o female with PMH of HTN, HLD, GERD, Glaucoma, osteoporosis, scoliosis present to ED complaining of  right hip/flank pain. Patient states pain started 4 days ago, radiating into abdomen and down right thigh. Pain worsened in the last 3 days to now 10 out 10. States she took Motrin and Tizanidine for a while without relief., then took Tylenol every 4 hours around the clock with minimal improvement. Today, pain was 10 out 10, constant. In ED, patient was given Oxycodone, Toradol, Lidocaine patch with some relief to 6 out of 10. Patient also states she experienced dysuria for one day. Patient denies fever/chills, fatigue, chest pain, difficulty breathing, change in bowel movements, change in urination, lower extremity edema, appetite changes, rashes.        (11 Mar 2022 10:09)    Surgery consulted for R inguinal hernia found on CT. Pt reports R hip/flank pain for the past few months that has worsened over the past 4 days. She has been taking care of her , who was recently diagnosed w/ leukemia and has not sought care for her pain. 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 She reports improvement in the pain since admission. She denies nausea or emesis. She is passing flatus and bowel movements. She has a surgical history significant for b/l laparoscopic oophorectomy and laparoscopic cholecystectomy.      PAST MEDICAL HISTORY:  Glaucoma    History of cholecystectomy    Bunion    Hypertension    Hypercholesteremia    Wrist fracture, right    Osteoarthritis    Osteoporosis    GERD (gastroesophageal reflux disease)        PAST SURGICAL HISTORY:  S/P cholecystectomy    Bunion    H/O ovarian cystectomy    S/P removal of left ovary    S/P removal of right ovary        MEDICATIONS:  acetaminophen     Tablet .. 650 milliGRAM(s) Oral every 6 hours PRN  aluminum hydroxide/magnesium hydroxide/simethicone Suspension 30 milliLiter(s) Oral every 4 hours PRN  ascorbic acid 500 milliGRAM(s) Oral daily  atorvastatin 20 milliGRAM(s) Oral at bedtime  enoxaparin Injectable 40 milliGRAM(s) SubCutaneous every 24 hours  influenza  Vaccine (HIGH DOSE) 0.7 milliLiter(s) IntraMuscular once  latanoprost 0.005% Ophthalmic Solution 1 Drop(s) Both EYES at bedtime  losartan 50 milliGRAM(s) Oral daily  ondansetron Injectable 4 milliGRAM(s) IV Push every 8 hours PRN  oxyCODONE    IR 5 milliGRAM(s) Oral every 6 hours PRN  pantoprazole    Tablet 40 milliGRAM(s) Oral before breakfast  timolol 0.25% Solution 1 Drop(s) Both EYES daily      ALLERGIES:  No Known Allergies      VITALS & I/Os:  Vital Signs Last 24 Hrs  T(C): 37.1 (11 Mar 2022 12:32), Max: 37.1 (11 Mar 2022 12:32)  T(F): 98.7 (11 Mar 2022 12:32), Max: 98.7 (11 Mar 2022 12:32)  HR: 68 (11 Mar 2022 12:32) (60 - 79)  BP: 108/68 (11 Mar 2022 12:32) (87/51 - 124/55)  BP(mean): 63 (11 Mar 2022 06:37) (63 - 67)  RR: 18 (11 Mar 2022 12:32) (15 - 18)  SpO2: 100% (11 Mar 2022 12:32) (98% - 100%)    I&O's Summary      PHYSICAL EXAM:  General: No acute distress  Respiratory: Nonlabored  Cardiovascular: RRR  Abdominal: Soft, nondistended, nontender. No appreciable inguinal hernia defect on either side. No TTP groins. No rebound or guarding. No organomegaly, no palpable mass.  Extremities: Warm    LABS:                        11.8   4.70  )-----------( 162      ( 10 Mar 2022 16:00 )             35.7     03-10    143  |  107  |  9   ----------------------------<  92  4.3   |  24  |  0.77    Ca    9.5      10 Mar 2022 16:00    TPro  7.0  /  Alb  4.5  /  TBili  0.4  /  DBili  x   /  AST  27  /  ALT  15  /  AlkPhos  39<L>  03-10    Lactate:  03-11 @ 07:53  0.8              Urinalysis Basic - ( 10 Mar 2022 15:54 )    Color: Colorless / Appearance: Clear / S.007 / pH: x  Gluc: x / Ketone: Negative  / Bili: Negative / Urobili: <2 mg/dL   Blood: x / Protein: Negative / Nitrite: Negative   Leuk Esterase: Negative / RBC: x / WBC x   Sq Epi: x / Non Sq Epi: x / Bacteria: x        IMAGING:  < from: CT Abdomen and Pelvis w/ IV Cont (03.10.22 @ 21:38) >  FINDINGS:  LOWER CHEST: Bibasilar subsegmental atelectasis.    LIVER: Within normal limits.  BILE DUCTS: Common bile duct is dilated to 12 mm, likely related to   postcholecystectomy state.  GALLBLADDER: Cholecystectomy.  SPLEEN: Within normal limits.  PANCREAS: Within normal limits.  ADRENALS: Within normal limits.  KIDNEYS/URETERS: Within normal limits.    BLADDER: Within normal limits.  REPRODUCTIVE ORGANS: Uterus and adnexa within normal limits.    BOWEL: Diffuse underdistention of the stomach, small bowel and colon   limits evaluation of the bowel wall.  No bowel obstruction.  Normal   appendix.  PERITONEUM: No ascites.  VESSELS: Mild atherosclerotic calcification at the origin of the celiac   trunk.  RETROPERITONEUM/LYMPH NODES: No lymphadenopathy.  ABDOMINAL WALL: There is an approximately 2 cm fat-containing direct   right inguinal hernia.  BONES: Moderate thoracolumbar scoliosis, convex to left, mild spinal   canal stenosis at L2-L3.  Mild to moderate spinal canal stenosis at L3-L4   and L4-L5.  With the apex at L2-L3.    IMPRESSION:  Approximately 2 cm fat-containing direct right inguinal hernia, which may   be causing the patient's groin pain.    < end of copied text >

## 2022-03-11 NOTE — PHYSICAL THERAPY INITIAL EVALUATION ADULT - DIAGNOSIS, PT EVAL
Pt presents with decreased functional mobility. Would benefit from PT services while at [facility].

## 2022-03-11 NOTE — H&P ADULT - NSHPSOCIALHISTORY_GEN_ALL_CORE
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

## 2022-03-11 NOTE — PHYSICAL THERAPY INITIAL EVALUATION ADULT - ADDITIONAL COMMENTS
Pt lives in a house, 5 exterior steps to enter, 12-13 steps within home. Pending chair lift (for spouse) - timeline unknown. Prior to admission, pt ambulated independently, no assistive device.

## 2022-03-11 NOTE — H&P ADULT - PROBLEM SELECTOR PLAN 7
DVT Prophylaxis Lovenox 40 mg SC QD  Dysuria resolved - U/A negative, no WBC, no fever DVT Prophylaxis Lovenox 40 mg SC QD  Dysuria resolved - U/A negative, no WBC, no fever - U/A C&S sent and performing by ED. Will f/u results.

## 2022-03-11 NOTE — PATIENT PROFILE ADULT - FALL HARM RISK - HARM RISK INTERVENTIONS

## 2022-03-11 NOTE — H&P ADULT - PROBLEM SELECTOR PLAN 2
- HTN  Stable  - DASH /TLC diet  - c/w home meds w/ holding parameters  - monitor BP & titrate BP meds PRN.

## 2022-03-11 NOTE — PHYSICAL THERAPY INITIAL EVALUATION ADULT - PATIENT PROFILE REVIEW, REHAB EVAL
PT orders received: ambulate with assistance. Consult with JOHN LOWRY, patient may participate in PT evaluation./yes

## 2022-03-11 NOTE — ED ADULT NURSE REASSESSMENT NOTE - NS ED NURSE REASSESS COMMENT FT1
Pt resting comfortably at this time. No complaints of chest pain, headache, nausea, dizziness, vomiting  SOB, fever, chills verbalized. MD Frias made aware of BP, bolus of NS started. Will repeat BP after fluid administration. Will continue to monitor.

## 2022-03-11 NOTE — H&P ADULT - PROBLEM SELECTOR PLAN 4
- Gastroesophageal reflux disease-   - f/u labs - CBC, CMP,   - PO or IV pain meds for relief  - PPI / Carafate  - Avoid spicy and aggravating foods.

## 2022-03-11 NOTE — H&P ADULT - NSICDXPASTMEDICALHX_GEN_ALL_CORE_FT
PAST MEDICAL HISTORY:  Bunion     GERD (gastroesophageal reflux disease)     Glaucoma     Hypercholesteremia     Hypertension     Osteoporosis     Wrist fracture, right s/p fall 1/13

## 2022-03-11 NOTE — H&P ADULT - ASSESSMENT
70 y/o female with PMH of HTN, HLD, GERD, Glaucoma, osteoporosis, scoliosis present to ED complaining of  right hip/flank pain. Patient states pain started 4 days ago, radiating into abdomen and down right thigh now 10 out 10.          72 y/o female with PMH of HTN, HLD, GERD, Glaucoma, osteoporosis, scoliosis present to ED complaining of  right hip/flank pain. Patient states pain started 4 days ago, radiating into abdomen and down right thigh now 10 out 10.

## 2022-03-12 ENCOUNTER — TRANSCRIPTION ENCOUNTER (OUTPATIENT)
Age: 72
End: 2022-03-12

## 2022-03-12 VITALS
TEMPERATURE: 98 F | OXYGEN SATURATION: 98 % | RESPIRATION RATE: 18 BRPM | HEART RATE: 73 BPM | DIASTOLIC BLOOD PRESSURE: 71 MMHG | SYSTOLIC BLOOD PRESSURE: 117 MMHG

## 2022-03-12 LAB
A1C WITH ESTIMATED AVERAGE GLUCOSE RESULT: 6.1 % — HIGH (ref 4–5.6)
ANION GAP SERPL CALC-SCNC: 11 MMOL/L — SIGNIFICANT CHANGE UP (ref 7–14)
BUN SERPL-MCNC: 8 MG/DL — SIGNIFICANT CHANGE UP (ref 7–23)
CALCIUM SERPL-MCNC: 8.4 MG/DL — SIGNIFICANT CHANGE UP (ref 8.4–10.5)
CHLORIDE SERPL-SCNC: 108 MMOL/L — HIGH (ref 98–107)
CHOLEST SERPL-MCNC: 104 MG/DL — SIGNIFICANT CHANGE UP
CO2 SERPL-SCNC: 24 MMOL/L — SIGNIFICANT CHANGE UP (ref 22–31)
CREAT SERPL-MCNC: 0.66 MG/DL — SIGNIFICANT CHANGE UP (ref 0.5–1.3)
EGFR: 93 ML/MIN/1.73M2 — SIGNIFICANT CHANGE UP
ESTIMATED AVERAGE GLUCOSE: 128 — SIGNIFICANT CHANGE UP
GLUCOSE SERPL-MCNC: 90 MG/DL — SIGNIFICANT CHANGE UP (ref 70–99)
HCT VFR BLD CALC: 32.5 % — LOW (ref 34.5–45)
HCV AB S/CO SERPL IA: 0.07 S/CO — SIGNIFICANT CHANGE UP (ref 0–0.99)
HCV AB SERPL-IMP: SIGNIFICANT CHANGE UP
HDLC SERPL-MCNC: 51 MG/DL — SIGNIFICANT CHANGE UP
HGB BLD-MCNC: 10.6 G/DL — LOW (ref 11.5–15.5)
LIPID PNL WITH DIRECT LDL SERPL: 40 MG/DL — SIGNIFICANT CHANGE UP
MAGNESIUM SERPL-MCNC: 2.2 MG/DL — SIGNIFICANT CHANGE UP (ref 1.6–2.6)
MCHC RBC-ENTMCNC: 30.5 PG — SIGNIFICANT CHANGE UP (ref 27–34)
MCHC RBC-ENTMCNC: 32.6 GM/DL — SIGNIFICANT CHANGE UP (ref 32–36)
MCV RBC AUTO: 93.7 FL — SIGNIFICANT CHANGE UP (ref 80–100)
NON HDL CHOLESTEROL: 53 MG/DL — SIGNIFICANT CHANGE UP
NRBC # BLD: 0 /100 WBCS — SIGNIFICANT CHANGE UP
NRBC # FLD: 0 K/UL — SIGNIFICANT CHANGE UP
PHOSPHATE SERPL-MCNC: 3 MG/DL — SIGNIFICANT CHANGE UP (ref 2.5–4.5)
PLATELET # BLD AUTO: 144 K/UL — LOW (ref 150–400)
POTASSIUM SERPL-MCNC: 4.4 MMOL/L — SIGNIFICANT CHANGE UP (ref 3.5–5.3)
POTASSIUM SERPL-SCNC: 4.4 MMOL/L — SIGNIFICANT CHANGE UP (ref 3.5–5.3)
RBC # BLD: 3.47 M/UL — LOW (ref 3.8–5.2)
RBC # FLD: 13.7 % — SIGNIFICANT CHANGE UP (ref 10.3–14.5)
SODIUM SERPL-SCNC: 143 MMOL/L — SIGNIFICANT CHANGE UP (ref 135–145)
TRIGL SERPL-MCNC: 67 MG/DL — SIGNIFICANT CHANGE UP
TSH SERPL-MCNC: 2.57 UIU/ML — SIGNIFICANT CHANGE UP (ref 0.27–4.2)
WBC # BLD: 3.81 K/UL — SIGNIFICANT CHANGE UP (ref 3.8–10.5)
WBC # FLD AUTO: 3.81 K/UL — SIGNIFICANT CHANGE UP (ref 3.8–10.5)

## 2022-03-12 PROCEDURE — 72131 CT LUMBAR SPINE W/O DYE: CPT | Mod: 26

## 2022-03-12 PROCEDURE — 99239 HOSP IP/OBS DSCHRG MGMT >30: CPT

## 2022-03-12 RX ORDER — LIDOCAINE 4 G/100G
1 CREAM TOPICAL
Qty: 14 | Refills: 0
Start: 2022-03-12 | End: 2022-03-25

## 2022-03-12 RX ORDER — LIDOCAINE 4 G/100G
1 CREAM TOPICAL ONCE
Refills: 0 | Status: DISCONTINUED | OUTPATIENT
Start: 2022-03-12 | End: 2022-03-12

## 2022-03-12 RX ORDER — SENNA PLUS 8.6 MG/1
2 TABLET ORAL AT BEDTIME
Refills: 0 | Status: DISCONTINUED | OUTPATIENT
Start: 2022-03-12 | End: 2022-03-12

## 2022-03-12 RX ORDER — POLYETHYLENE GLYCOL 3350 17 G/17G
17 POWDER, FOR SOLUTION ORAL
Refills: 0 | Status: DISCONTINUED | OUTPATIENT
Start: 2022-03-12 | End: 2022-03-12

## 2022-03-12 RX ORDER — ACETAMINOPHEN 500 MG
650 TABLET ORAL EVERY 6 HOURS
Refills: 0 | Status: DISCONTINUED | OUTPATIENT
Start: 2022-03-12 | End: 2022-03-12

## 2022-03-12 RX ORDER — POLYETHYLENE GLYCOL 3350 17 G/17G
17 POWDER, FOR SOLUTION ORAL ONCE
Refills: 0 | Status: COMPLETED | OUTPATIENT
Start: 2022-03-12 | End: 2022-03-12

## 2022-03-12 RX ADMIN — LOSARTAN POTASSIUM 50 MILLIGRAM(S): 100 TABLET, FILM COATED ORAL at 05:18

## 2022-03-12 RX ADMIN — Medication 650 MILLIGRAM(S): at 12:23

## 2022-03-12 RX ADMIN — Medication 24 MILLIGRAM(S): at 12:20

## 2022-03-12 RX ADMIN — Medication 1 DROP(S): at 12:20

## 2022-03-12 RX ADMIN — PANTOPRAZOLE SODIUM 40 MILLIGRAM(S): 20 TABLET, DELAYED RELEASE ORAL at 05:18

## 2022-03-12 RX ADMIN — POLYETHYLENE GLYCOL 3350 17 GRAM(S): 17 POWDER, FOR SOLUTION ORAL at 12:20

## 2022-03-12 RX ADMIN — Medication 650 MILLIGRAM(S): at 05:19

## 2022-03-12 RX ADMIN — Medication 500 MILLIGRAM(S): at 12:20

## 2022-03-12 RX ADMIN — Medication 650 MILLIGRAM(S): at 12:53

## 2022-03-12 RX ADMIN — Medication 650 MILLIGRAM(S): at 06:00

## 2022-03-12 RX ADMIN — LIDOCAINE 1 PATCH: 4 CREAM TOPICAL at 12:23

## 2022-03-12 NOTE — PROGRESS NOTE ADULT - PROBLEM SELECTOR PLAN 7
DVT Prophylaxis Lovenox 40 mg SC QD      Dispo: Discharge home today on Medrol dosepak and outpt PT.   ~45 minutes

## 2022-03-12 NOTE — PROGRESS NOTE ADULT - SUBJECTIVE AND OBJECTIVE BOX
[facility] Division of Hospital Medicine  Savicarlosana JacksonDO  Pager (M-F, 8A-5P): [phone]      Patient is a 71y old  Female who presents with a chief complaint of Right hip pain (12 Mar 2022 12:22)      SUBJECTIVE / OVERNIGHT EVENTS: no overnight events   ADDITIONAL REVIEW OF SYSTEMS:    MEDICATIONS  (STANDING):  acetaminophen     Tablet .. 650 milliGRAM(s) Oral every 6 hours  ascorbic acid 500 milliGRAM(s) Oral daily  atorvastatin 20 milliGRAM(s) Oral at bedtime  enoxaparin Injectable 40 milliGRAM(s) SubCutaneous every 24 hours  influenza  Vaccine (HIGH DOSE) 0.7 milliLiter(s) IntraMuscular once  latanoprost 0.005% Ophthalmic Solution 1 Drop(s) Both EYES at bedtime  lidocaine   4% Patch 1 Patch Transdermal daily  losartan 50 milliGRAM(s) Oral daily  methylPREDNISolone   Oral   pantoprazole    Tablet 40 milliGRAM(s) Oral before breakfast  polyethylene glycol 3350 17 Gram(s) Oral two times a day  senna 2 Tablet(s) Oral at bedtime  timolol 0.25% Solution 1 Drop(s) Both EYES daily    MEDICATIONS  (PRN):  aluminum hydroxide/magnesium hydroxide/simethicone Suspension 30 milliLiter(s) Oral every 4 hours PRN Dyspepsia  ondansetron Injectable 4 milliGRAM(s) IV Push every 8 hours PRN Nausea and/or Vomiting  oxyCODONE    IR 5 milliGRAM(s) Oral every 6 hours PRN Moderate and severe Pain (4 - 10)      CAPILLARY BLOOD GLUCOSE        I&O's Summary      PHYSICAL EXAM:  Vital Signs Last 24 Hrs  T(C): 36.7 (12 Mar 2022 12:30), Max: 37 (11 Mar 2022 21:51)  T(F): 98 (12 Mar 2022 12:30), Max: 98.6 (11 Mar 2022 21:51)  HR: 73 (12 Mar 2022 12:30) (72 - 74)  BP: 117/71 (12 Mar 2022 12:30) (110/64 - 117/71)  BP(mean): --  RR: 18 (12 Mar 2022 12:30) (16 - 18)  SpO2: 98% (12 Mar 2022 12:30) (98% - 99%)  CONSTITUTIONAL: NAD, well-developed, well-groomed  EYES: EOMI; conjunctiva and sclera clear  ENMT: Moist oral mucosa, no pharyngeal injection or exudates; normal dentition  NECK: Supple, no palpable masses; no thyromegaly  RESPIRATORY: Normal respiratory effort; lungs are clear to auscultation bilaterally  CARDIOVASCULAR: Regular rate and rhythm, normal S1 and S2, no murmur/rub/gallop; No lower extremity edema; Peripheral pulses are 2+ bilaterally  ABDOMEN: Nontender to palpation, normoactive bowel sounds, no rebound/guarding; No hepatosplenomegaly  MUSKULOSKELETAL:  no clubbing or cyanosis of digits; no joint swelling or tenderness to palpation  PSYCH: A+O to person, place, and time; affect appropriate  NEUROLOGY: CN 2-12 are intact and symmetric; no gross sensory deficits;   SKIN: No rashes; no palpable lesions    LABS:                        10.6   3.81  )-----------( 144      ( 12 Mar 2022 06:27 )             32.5     03-12    143  |  108<H>  |  8   ----------------------------<  90  4.4   |  24  |  0.66    Ca    8.4      12 Mar 2022 06:27  Phos  3.0     03-12  Mg     2.20     03-12    TPro  7.0  /  Alb  4.5  /  TBili  0.4  /  DBili  x   /  AST  27  /  ALT  15  /  AlkPhos  39<L>  03-10          Urinalysis Basic - ( 10 Mar 2022 15:54 )    Color: Colorless / Appearance: Clear / S.007 / pH: x  Gluc: x / Ketone: Negative  / Bili: Negative / Urobili: <2 mg/dL   Blood: x / Protein: Negative / Nitrite: Negative   Leuk Esterase: Negative / RBC: x / WBC x   Sq Epi: x / Non Sq Epi: x / Bacteria: x        Culture - Urine (collected 10 Mar 2022 16:01)  Source: Clean Catch Clean Catch (Midstream)  Final Report (11 Mar 2022 13:32):    <10,000 CFU/mL Normal Urogenital Nelli        RADIOLOGY & ADDITIONAL TESTS:  Results Reviewed:   Imaging Personally Reviewed:  Electrocardiogram Personally Reviewed:    COORDINATION OF CARE:  Care Discussed with Consultants/Other Providers [Y/N]: Y  Prior or Outpatient Records Reviewed [Y/N]: Y   [facility] Division of Hospital Medicine  González JacksonDO  Pager (M-F, 8A-5P): [phone]      Patient is a 71y old  Female who presents with a chief complaint of Right hip pain (12 Mar 2022 12:22)      SUBJECTIVE / OVERNIGHT EVENTS: no overnight events, pt seen at bedside, reports continued R back pain radiating down to her thigh. Pending CT L spine.   Was able to ambulate with me slowly to the bathroom.    ADDITIONAL REVIEW OF SYSTEMS: no cp/sob, +constipation     MEDICATIONS  (STANDING):  acetaminophen     Tablet .. 650 milliGRAM(s) Oral every 6 hours  ascorbic acid 500 milliGRAM(s) Oral daily  atorvastatin 20 milliGRAM(s) Oral at bedtime  enoxaparin Injectable 40 milliGRAM(s) SubCutaneous every 24 hours  influenza  Vaccine (HIGH DOSE) 0.7 milliLiter(s) IntraMuscular once  latanoprost 0.005% Ophthalmic Solution 1 Drop(s) Both EYES at bedtime  lidocaine   4% Patch 1 Patch Transdermal daily  losartan 50 milliGRAM(s) Oral daily  methylPREDNISolone   Oral   pantoprazole    Tablet 40 milliGRAM(s) Oral before breakfast  polyethylene glycol 3350 17 Gram(s) Oral two times a day  senna 2 Tablet(s) Oral at bedtime  timolol 0.25% Solution 1 Drop(s) Both EYES daily    MEDICATIONS  (PRN):  aluminum hydroxide/magnesium hydroxide/simethicone Suspension 30 milliLiter(s) Oral every 4 hours PRN Dyspepsia  ondansetron Injectable 4 milliGRAM(s) IV Push every 8 hours PRN Nausea and/or Vomiting  oxyCODONE    IR 5 milliGRAM(s) Oral every 6 hours PRN Moderate and severe Pain (4 - 10)      CAPILLARY BLOOD GLUCOSE        I&O's Summary      PHYSICAL EXAM:  Vital Signs Last 24 Hrs  T(C): 36.7 (12 Mar 2022 12:30), Max: 37 (11 Mar 2022 21:51)  T(F): 98 (12 Mar 2022 12:30), Max: 98.6 (11 Mar 2022 21:51)  HR: 73 (12 Mar 2022 12:30) (72 - 74)  BP: 117/71 (12 Mar 2022 12:30) (110/64 - 117/71)  BP(mean): --  RR: 18 (12 Mar 2022 12:30) (16 - 18)  SpO2: 98% (12 Mar 2022 12:30) (98% - 99%)  CONSTITUTIONAL: NAD, well-appearing, elderly   HEENT: EOMI, MMM  RESPIRATORY: Normal respiratory effort; lungs are clear to auscultation bilaterally  CARDIOVASCULAR: Regular rate and rhythm, normal S1 and S2, no murmurs; no edema   ABDOMEN: Nontender to palpation, normoactive bowel sounds, soft  MUSKULOSKELETAL: no paraspinal tenderness or CVA tenderness   PSYCH: A+O to person, place, and time; affect appropriate  NEUROLOGY: CN 2-12 are intact and symmetric; no gross sensory deficits   SKIN: No rashes; no palpable lesions    LABS:                        10.6   3.81  )-----------( 144      ( 12 Mar 2022 06:27 )             32.5     03-12    143  |  108<H>  |  8   ----------------------------<  90  4.4   |  24  |  0.66    Ca    8.4      12 Mar 2022 06:27  Phos  3.0     03-12  Mg     2.20     -12    TPro  7.0  /  Alb  4.5  /  TBili  0.4  /  DBili  x   /  AST  27  /  ALT  15  /  AlkPhos  39<L>  03-10          Urinalysis Basic - ( 10 Mar 2022 15:54 )    Color: Colorless / Appearance: Clear / S.007 / pH: x  Gluc: x / Ketone: Negative  / Bili: Negative / Urobili: <2 mg/dL   Blood: x / Protein: Negative / Nitrite: Negative   Leuk Esterase: Negative / RBC: x / WBC x   Sq Epi: x / Non Sq Epi: x / Bacteria: x        Culture - Urine (collected 10 Mar 2022 16:01)  Source: Clean Catch Clean Catch (Midstream)  Final Report (11 Mar 2022 13:32):    <10,000 CFU/mL Normal Urogenital Nelli        RADIOLOGY & ADDITIONAL TESTS:  < from: CT Lumbar Spine No Cont (22 @ 15:04) >    IMPRESSION: No acute findings.    Redemonstration of extreme leftward convex curvature to the lumbar spine.    Variable degrees of multilevel canal and foraminal narrowing, worst at   the L2-L3 and L3-L4 levels. This can be more optimally evaluated with an   MRI study.    --- End of Report ---            SHARRON MUNOZ MD; Attending Radiologist  This document has been electronically signed. Mar 12 2022  3:18PM    < end of copied text >    COORDINATION OF CARE:  Care Discussed with Consultants/Other Providers [Y/N]: Y  Prior or Outpatient Records Reviewed [Y/N]: Y

## 2022-03-12 NOTE — PROGRESS NOTE ADULT - PROBLEM SELECTOR PLAN 1
- Patient c/o back / right hip pain radiating to right thigh  - CT of abdomen, Pelvis, Femur done in ED  - Complaining of lower abdominal pain - CT of Abdomen showed Approximately 2 cm fat-containing direct right inguinal hernia, which may be causing the patient's groin pain. Seen by surgery, recommended for outpt followup  - CT L spine reviewed - no acute pathology   - CT Pelvis showed No evidence of acute fracture. 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  - Discussed with Ortho about moderate to severe patellofemoral arthrosis - f/u as outpatient- Treat with NSAID, PT, ICE as needed.  - PT recommends outpt PT   - Started solumedrol dose pack as pt cannot tolerate opioids d/t hypotension and NSAIDs d/t GI upset.  - DC to home today as on medrol dosepak, lidocaine patch. Can followup with PCP this week if continued pain.

## 2022-03-12 NOTE — DISCHARGE NOTE PROVIDER - CARE PROVIDER_API CALL
DISCHARGE Jess Michaud)  Critical Care Medicine; Surgery  [address] [place] [address]  Phone: [phone]  Fax: [phone]  Follow Up Time:

## 2022-03-12 NOTE — PROGRESS NOTE ADULT - ASSESSMENT
70 y/o female with PMH of HTN, HLD, GERD, Glaucoma, osteoporosis, scoliosis present to ED complaining of  right hip/flank pain. Patient states pain started 4 days ago, radiating into abdomen and down right thigh now 10 out 10.

## 2022-03-12 NOTE — DISCHARGE NOTE PROVIDER - NSDCMRMEDTOKEN_GEN_ALL_CORE_FT
DEXILANT DR 60 MG CAPSULE: take 1 capsule by mouth every morning before breakfast  LATANOPROST 0.005% EYE DROPS: ADMINISTER 1 drop into both eyes nightly  LIDOCAINE-PRILOCAINE CREAM: apply once daily if needed for pain  Lipitor 20 mg oral tablet: 1 tab(s) orally once a day  losartan 50 mg oral tablet: 1 tab(s) orally once a day  Prolia 60 mg/mL subcutaneous solution: 60 milligram(s) subcutaneous every 6 months  timolol hemihydrate 0.5% ophthalmic solution: 1 drop(s) to each affected eye once a day  Tylenol 325 mg oral tablet: 2 tab(s) orally every 4 hours, As Needed  Vitamin C 500 mg oral capsule: 1 cap(s) orally once a day   DEXILANT DR 60 MG CAPSULE: take 1 capsule by mouth every morning before breakfast  LATANOPROST 0.005% EYE DROPS: ADMINISTER 1 drop into both eyes nightly  lidocaine 4% topical film: Apply topically to affected area (right hip) once a day   LIDOCAINE-PRILOCAINE CREAM: apply once daily if needed for pain  Lipitor 20 mg oral tablet: 1 tab(s) orally once a day  losartan 50 mg oral tablet: 1 tab(s) orally once a day  Physical Therapy 3-5 times per week :   Prolia 60 mg/mL subcutaneous solution: 60 milligram(s) subcutaneous every 6 months  timolol hemihydrate 0.5% ophthalmic solution: 1 drop(s) to each affected eye once a day  Tylenol 325 mg oral tablet: 2 tab(s) orally every 4 hours, As Needed  Vitamin C 500 mg oral capsule: 1 cap(s) orally once a day   DEXILANT DR 60 MG CAPSULE: take 1 capsule by mouth every morning before breakfast  LATANOPROST 0.005% EYE DROPS: ADMINISTER 1 drop into both eyes nightly  lidocaine 4% topical film: Apply topically to affected area (right hip) once a day   LIDOCAINE-PRILOCAINE CREAM: apply once daily if needed for pain  Lipitor 20 mg oral tablet: 1 tab(s) orally once a day  losartan 50 mg oral tablet: 1 tab(s) orally once a day  MethylPREDNISolone Dose Pack 4 mg oral tablet: 1 tab(s) orally once a day     *patient received 24mg 3/12/22. Please start with Day 2 of dose pack**  Physical Therapy 3-5 times per week :  3 to 5 times a week   Prolia 60 mg/mL subcutaneous solution: 60 milligram(s) subcutaneous every 6 months  timolol hemihydrate 0.5% ophthalmic solution: 1 drop(s) to each affected eye once a day  Tylenol 325 mg oral tablet: 2 tab(s) orally every 4 hours, As Needed  Vitamin C 500 mg oral capsule: 1 cap(s) orally once a day

## 2022-03-12 NOTE — DISCHARGE NOTE PROVIDER - NSFOLLOWUPCLINICS_GEN_ALL_ED_FT
[facility] Orthopedic Surgery  Orthopedic Surgery  [address], 3rd & 4th [place] [address]  Phone: [phone]  Fax:

## 2022-03-12 NOTE — DISCHARGE NOTE PROVIDER - NSDCFUADDAPPT_GEN_ALL_CORE_FT
Please follow up with surgeon Dr. Jess Michaud for elective hernia repair if you desire at [phone]     Please follow up with your PCP within 2 weeks of discharge    Please follow up with surgeon Dr. Jess Michaud for elective hernia repair if you desire at [phone]     Please follow up with your PCP within 2 weeks of discharge     You can follow up with your PCP or an orthopedist to get MRI of lumbar spine

## 2022-03-12 NOTE — DISCHARGE NOTE PROVIDER - HOSPITAL COURSE
Acute right hip pain.   - Patient c/o back / right hip pain radiating to right thigh  - CT of abdomen, Pelvis, Femur done in ED  - Complaining of lower abdominal pain - CT of Abdomen showed Approximately 2 cm fat-containing direct right inguinal hernia, which may be causing the patient's groin pain.  - Surgery consulted for inguinal hernia evaluation-- 2 cm fat-containing direct right inguinal hernia, which may be causing the patient's groin pain. - Surgery consulted -- f/u as outpatient  - CT Pelvis showed No evidence of acute fracture. 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 Consider MRI if clinical suspicion persists for underlying occult pathology.  - Discussed with Ortho about moderate to severe patellofemoral arthrosis - f/u as outpatient- Treat with NSAID, PT, ICE as needed.  - Added CT of Lumbar spine, if needed MRI of LS spine to r/o spinal stenosis  - PO or IV pain meds for relief with Toradol, Tylenol, Oxycodone, Lidocaine patch  - PPI for abdominal pain  - PT consult.  - CT lumbar spine---     : HTN (hypertension).   - HTN  Stable  - DASH /TLC diet  - c/w home meds w/ holding parameters  - monitor BP & titrate BP meds PRN.    HLD (hyperlipidemia).   Dyslipidemia, Continue with Atorvastatin  DASH diet      : Osteoporosis.   Hx of osteoporosis. On Prolia 60 mg SC twice a year  No evidence of fracture on CT of Hip, Pelvis or Femur.    Dysuria   -resolved - U/A negative, no WBC, no fever - U/A C&S sent and performing by ED. Will f/u results.   70 y/o female with PMH of HTN, HLD, GERD, Glaucoma, osteoporosis, scoliosis present to ED complaining of  right hip/flank pain. Patient states pain started 4 days ago, radiating into abdomen and down right thigh     Acute right hip pain.   - Patient c/o back / right hip pain radiating to right thigh  - Complaining of lower abdominal pain - CT of Abdomen showed Approximately 2 cm fat-containing direct right inguinal hernia, which may be causing the patient's groin pain.  - Surgery consulted for inguinal hernia evaluation -- pt can f/u as outpatient. No acute surgical intervention needed  - CT Pelvis showed No evidence of acute fracture. 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   - Discussed with Ortho about moderate to severe patellofemoral arthrosis - f/u as outpatient- Treat with NSAID, PT, ICE as needed.  - pain meds given for relief with Toradol, Tylenol, Oxycodone, Lidocaine patch  - PPI for abdominal pain  - PT consulted- pt will get script for outpatient PT  - CT lumbar spine ordered to r/o lumbar spinal stenosis---  - steroid taper started 3/12/22 and will finish outpatient      : HTN (hypertension).    Stable  - DASH /TLC diet  - c/w home meds w/ holding parameters      HLD (hyperlipidemia).   Dyslipidemia, Continue with Atorvastatin  DASH diet    : Osteoporosis.   Hx of osteoporosis. On Prolia 60 mg SC twice a year  No evidence of fracture on CT of Hip, Pelvis or Femur.    Dysuria   -resolved - U/A negative, no WBC, no fever - UCx neg    Pt is medically cleared for discharge to home as discussed with Dr. Jackson on 3/12/22     70 y/o female with PMH of HTN, HLD, GERD, Glaucoma, osteoporosis, scoliosis present to ED complaining of  right hip/flank pain. Patient states pain started 4 days ago, radiating into abdomen and down right thigh     Acute right hip pain.   - Patient c/o back / right hip pain radiating to right thigh  - Complaining of lower abdominal pain - CT of Abdomen showed Approximately 2 cm fat-containing direct right inguinal hernia, which may be causing the patient's groin pain.  - Surgery consulted for inguinal hernia evaluation -- pt can f/u as outpatient. No acute surgical intervention needed  - CT Pelvis showed No evidence of acute fracture. 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   - Discussed with Ortho about moderate to severe patellofemoral arthrosis - f/u as outpatient- Treat with NSAID, PT, ICE as needed.  - pain meds given for relief with Toradol, Tylenol, Oxycodone, Lidocaine patch  - PPI for abdominal pain  - PT consulted- pt will get script for outpatient PT  - CT lumbar spine ordered to r/o lumbar spinal stenosis---Variable degrees of multilevel canal and foraminal narrowing, worst at  the L2-L3 and L3-L4 levels. MRI can be done outpatient   - steroid taper started 3/12/22 and will finish outpatient      : HTN (hypertension).    Stable  - DASH /TLC diet  - c/w home meds w/ holding parameters    HLD (hyperlipidemia).   Dyslipidemia, Continue with Atorvastatin  DASH diet    : Osteoporosis.   Hx of osteoporosis. On Prolia 60 mg SC twice a year  No evidence of fracture on CT of Hip, Pelvis or Femur.    Dysuria   -resolved - U/A negative, no WBC, no fever - UCx neg    Pt is medically cleared for discharge to home as discussed with Dr. Jackson on 3/12/22  Reviewed discharge medications with patient; All new medications requiring new prescription sent to pharmacy of patients choice. Reviewed need for prescription for previous home medicaitons and new prescriptions sent if requested. Patient in agreement and understands.       72 y/o female with PMH of HTN, HLD, GERD, Glaucoma, osteoporosis, scoliosis present to ED complaining of  right hip/flank pain. Patient states pain started 4 days ago, radiating into abdomen and down right thigh     Acute right hip pain.   - Patient c/o back / right hip pain radiating to right thigh  - Complaining of lower abdominal pain - CT of Abdomen showed Approximately 2 cm fat-containing direct right inguinal hernia, which may be causing the patient's groin pain.  - Surgery consulted for inguinal hernia evaluation -- pt can f/u as outpatient. No acute surgical intervention needed  - CT Pelvis showed No evidence of acute fracture. 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   - Discussed with Ortho about moderate to severe patellofemoral arthrosis - f/u as outpatient- Treat with NSAID, PT, ICE as needed.  - pain meds given for relief with Toradol, Tylenol, Oxycodone, Lidocaine patch  - PPI for abdominal pain  - PT consulted- pt will get script for outpatient PT  - CT lumbar spine ordered to r/o lumbar spinal stenosis---Variable degrees of multilevel canal and foraminal narrowing, worst at  the L2-L3 and L3-L4 levels. MRI can be done outpatient   - medrol dose pack started 3/12/22 and will finish outpatient      : HTN (hypertension).    Stable  - DASH /TLC diet  - c/w home meds w/ holding parameters    HLD (hyperlipidemia).   Dyslipidemia, Continue with Atorvastatin  DASH diet    : Osteoporosis.   Hx of osteoporosis. On Prolia 60 mg SC twice a year  No evidence of fracture on CT of Hip, Pelvis or Femur.    Dysuria   -resolved - U/A negative, no WBC, no fever - UCx neg    Pt is medically cleared for discharge to home as discussed with Dr. Jackson on 3/12/22  Reviewed discharge medications with patient; All new medications requiring new prescription sent to pharmacy of patients choice. Reviewed need for prescription for previous home medicaitons and new prescriptions sent if requested. Patient in agreement and understands.

## 2022-03-12 NOTE — DISCHARGE NOTE NURSING/CASE MANAGEMENT/SOCIAL WORK - PATIENT PORTAL LINK FT
You can access the FollowMyHealth Patient Portal offered by [facility] by registering at the following website: [email or web address]. By joining [facility]’s FollowMyHealth portal, you will also be able to view your health information using other applications (apps) compatible with our system.

## 2022-03-12 NOTE — DISCHARGE NOTE NURSING/CASE MANAGEMENT/SOCIAL WORK - NSDCFUADDAPPT_GEN_ALL_CORE_FT
Please follow up with surgeon Dr. Jess Michaud for elective hernia repair if you desire at [phone]     Please follow up with your PCP within 2 weeks of discharge     You can follow up with your PCP or an orthopedist to get MRI of lumbar spine

## 2022-03-12 NOTE — DISCHARGE NOTE PROVIDER - NSDCCPCAREPLAN_GEN_ALL_CORE_FT
PRINCIPAL DISCHARGE DIAGNOSIS  Diagnosis: Right hip pain  Assessment and Plan of Treatment: You came into the hospital with hip pain. You had imaging of your spine and pelvis which showed no acute fractures or cause of your pain. We gave you pain medications and started you on steroids which you will continue at home (dose will be gradually reduced until you stop steroids). Please follow up with your PCP for further management and take pain medications as needed. You will also get physical therapy outpatient to help with mobility      SECONDARY DISCHARGE DIAGNOSES  Diagnosis: HTN (hypertension)  Assessment and Plan of Treatment: Continue blood pressure medication regimen as directed. Monitor for any visual changes, headaches or dizziness.  Monitor blood pressure regularly.  Follow up with your primary care provider for further management for high blood pressure.    Diagnosis: HLD (hyperlipidemia)  Assessment and Plan of Treatment: Continue prescribed medications to control your cholesterol levels and a DASH (Low fat/salt) diet. Follow up with your primary care provider upon discharge for further management and monitoring of cholesterol levels.    Diagnosis: OA (osteoarthritis)  Assessment and Plan of Treatment: You have osteoarthritis. This is when your bone density is low and it puts you at risk for fractures. Please make sure to follow up with your PCP upon discharge and continue taking your medication for osteoporosis as prescribed    Diagnosis: Right inguinal hernia  Assessment and Plan of Treatment: You were found to have a hernia in your groin. You were seen by a surgical team, and they determined that no surgery was needed urgently,. You can follow up with the surgical clinic outpatient for further management.

## 2022-03-12 NOTE — DISCHARGE NOTE NURSING/CASE MANAGEMENT/SOCIAL WORK - WILL THE PATIENT ACCEPT THE PFIZER COVID-19 VACCINE IF ELIGIBLE AND IT IS AVAILABLE?
2016         RE: Wellington Loredo                               To: [facility]  MR#: [id number]                                   [address]   : AUG [address]   ENC:                                              Maxim[place], [address]   (Exam #: [id number])                           Fax: [phone]      The LMP of this 34year old,  3, para 2 patient was unknown, her   working ALFRED is MAR 20 2016 and the current gestational age is 26 weeks 5   days by 1st Trimester Sono  A sonographic examination was performed on 2016 using real time equipment  The ultrasound examination was   performed using abdominal technique  The patient has a BMI of 32 1  Her   blood pressure today was 112/75  Earliest ultrasound found in her record: 9/4/15   11w5d  3/20/16 ALFRED                  Maria Elena Sosa has no complaints today  She reports regular fetal movements   and denies problems related to hypertension,  labor, or vaginal   bleeding  Gestational diabetes remains diet controlled  Cardiac motion was observed at 141 bpm       INDICATIONS      diabetes, gestational, A1      Exam Types      Level I      RESULTS      Fetus # 1 of 1   Vertex presentation   Fetal growth appeared normal   Placenta Location = Left lateral, fundal   No placenta previa   Placenta Grade = II      MEASUREMENTS (* Included In Average GA)      OFD             10 2 cm   AC              29 5 cm        33 weeks 4 days* (62%)   BPD              8 2 cm        33 weeks 0 days* (47%)   HC              29 4 cm        32 weeks 1 day * (23%)   Femur            5 9 cm        30 weeks 5 days* (17%)      Cerebellum       4 1 cm        33 weeks 6 days      HC/AC           1 00   FL/AC           0 20   FL/BPD          0 72   EFW (Ac/Fl/Hc)  1984 grams - 4 lbs 6 oz                 (40%)      THE AVERAGE GESTATIONAL AGE is 32 weeks 3 days +/- 18 days        AMNIOTIC FLUID Q1: 1 4      Q2: 4 0      Q3: 4 1      Q4: 2 2   JAIDEN Total = 11 7 cm   Amniotic Fluid: Normal      FETAL VESSELS                                     S/D   PI    RI    PSV   AEDV RF                                                    cm/s       Umbilical Artery           2 68  1 04  0 63        No   No      IMPRESSION      Way IUP   32 weeks and 3 days by this ultrasound  (ALFRED=MAR 22 2016)   32 weeks and 5 days by 1st Tri Sono  (ALFRED=MAR 20 2016)   Vertex presentation   Fetal growth appeared normal   Regular fetal heart rate of 141 bpm   Left lateral, fundal placenta   No placenta previa      GENERAL COMMENT      No fetal structural abnormality is identified on the Level I survey today  The fetal brain, four-chamber heart, stomach, kidneys, bladder, and   diaphragm appear normal   Fetal interval growth and amniotic fluid volume   are normal       Today's ultrasound findings and suggested follow-up were discussed in   detail with Alia Delgado  She will return to the [address] [place] [address] in 4 weeks   to assess interval growth  Daily third trimester fetal kick counting was   discussed at the visit today  Alia Delgado should continue to maintain   contact with the Diabetes and Pregnancy program in the [address] [place] [address] at   least once a week for review of her blood glucose values  The face to face time, in addition to time spent discussing ultrasound   results, was 10 minutes, greater than 50% of which was spent during   counseling and coordination of care  MAURICIO Holland M D     Maternal-Fetal Medicine   Electronically signed 01/29/16 14:00 Not applicable

## 2022-03-16 ENCOUNTER — TRANSCRIPTION ENCOUNTER (OUTPATIENT)
Age: 72
End: 2022-03-16

## 2022-03-16 PROBLEM — K21.9 GASTRO-ESOPHAGEAL REFLUX DISEASE WITHOUT ESOPHAGITIS: Chronic | Status: ACTIVE | Noted: 2022-03-11

## 2022-03-22 ENCOUNTER — APPOINTMENT (OUTPATIENT)
Dept: TRAUMA SURGERY | Facility: HOSPITAL | Age: 72
End: 2022-03-22
Payer: MEDICARE

## 2022-03-22 VITALS
BODY MASS INDEX: 20.24 KG/M2 | DIASTOLIC BLOOD PRESSURE: 71 MMHG | TEMPERATURE: 96.9 F | WEIGHT: 110 LBS | HEIGHT: 62 IN | HEART RATE: 68 BPM | SYSTOLIC BLOOD PRESSURE: 140 MMHG

## 2022-03-22 DIAGNOSIS — K40.90 UNILATERAL INGUINAL HERNIA, W/OUT OBSTRUCTION OR GANGRENE, NOT SPECIFIED AS RECURRENT: ICD-10-CM

## 2022-03-22 PROCEDURE — 99202 OFFICE O/P NEW SF 15 MIN: CPT

## 2022-03-24 PROBLEM — K40.90 RIGHT INGUINAL HERNIA: Status: ACTIVE | Noted: 2022-03-24

## 2022-03-24 NOTE — ASSESSMENT
[FreeTextEntry1] : 72yo F with R inguinal hernia - reducible, fat containing on CT, but significantly symptomatic to the point of affecting her life on a near daily basis. 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 I recommended a laparoscopic vs robotic inguinal hernia repair and discussed the risks, benefits, and alternatives including the risk of hernia recurrence, mesh infection, or injury to nearby structures. The pt will discuss with her family and decide on timing of surgery. She was also educated on signs of bowel incarceration/strangulation, and when to seek emergency care.

## 2022-03-24 NOTE — PHYSICAL EXAM
[Normal Heart Sounds] : normal heart sounds [Normal Breath Sounds] : Normal breath sounds [No Rash or Lesion] : No rash or lesion [Alert] : alert [Oriented to Person] : oriented to person [Oriented to Time] : oriented to time [Oriented to Place] : oriented to place [Calm] : calm [de-identified] : NAD [de-identified] : Soft, nondistended, nontender, R groin with reducible R inguinal hernia - small [de-identified] : No deformities

## 2022-03-24 NOTE — HISTORY OF PRESENT ILLNESS
[de-identified] : 72 yo F h/o lap BSO and scar excision for suture granuloma, presents for consultation regarding a newly diagnosed R inguinal hernia. The pt was recently admitted (3/11) to [facility] for R groin pain. She had a CT abd/pelvis with IV contrast which demonstrated a small fat containing inguinal hernia. She was evaluated by surgery and referred for outpatient repair. 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 \par \par Since discharge, the pt reports having intermittent pain in the R groin. Occasionally the pain is severe. It is worse when standing for long periods, and is relieved by lying down and applying a warm compress. Occasionally she has nausea when the pain is severe, but she has not vomited or had distension. She continues to be concerned about the timing of surgery and her ability to care for her .

## 2022-03-24 NOTE — PLAN
[FreeTextEntry1] : - Schedule for laparoscopic vs robotic right inguinal hernia repair, possible left when convenient for pt \par - pre-op medical optimization

## 2022-04-08 ENCOUNTER — OUTPATIENT (OUTPATIENT)
Dept: OUTPATIENT SERVICES | Facility: HOSPITAL | Age: 72
LOS: 1 days | End: 2022-04-08

## 2022-04-08 VITALS
WEIGHT: 108.03 LBS | SYSTOLIC BLOOD PRESSURE: 148 MMHG | RESPIRATION RATE: 18 BRPM | HEIGHT: 59.25 IN | HEART RATE: 62 BPM | DIASTOLIC BLOOD PRESSURE: 70 MMHG | TEMPERATURE: 98 F | OXYGEN SATURATION: 97 %

## 2022-04-08 DIAGNOSIS — K40.90 UNILATERAL INGUINAL HERNIA, WITHOUT OBSTRUCTION OR GANGRENE, NOT SPECIFIED AS RECURRENT: ICD-10-CM

## 2022-04-08 DIAGNOSIS — I10 ESSENTIAL (PRIMARY) HYPERTENSION: ICD-10-CM

## 2022-04-08 DIAGNOSIS — Z90.721 ACQUIRED ABSENCE OF OVARIES, UNILATERAL: Chronic | ICD-10-CM

## 2022-04-08 DIAGNOSIS — R19.09 OTHER INTRA-ABDOMINAL AND PELVIC SWELLING, MASS AND LUMP: Chronic | ICD-10-CM

## 2022-04-08 LAB
ANION GAP SERPL CALC-SCNC: 11 MMOL/L — SIGNIFICANT CHANGE UP (ref 7–14)
BUN SERPL-MCNC: 10 MG/DL — SIGNIFICANT CHANGE UP (ref 7–23)
CALCIUM SERPL-MCNC: 9.6 MG/DL — SIGNIFICANT CHANGE UP (ref 8.4–10.5)
CHLORIDE SERPL-SCNC: 103 MMOL/L — SIGNIFICANT CHANGE UP (ref 98–107)
CO2 SERPL-SCNC: 24 MMOL/L — SIGNIFICANT CHANGE UP (ref 22–31)
CREAT SERPL-MCNC: 0.77 MG/DL — SIGNIFICANT CHANGE UP (ref 0.5–1.3)
EGFR: 82 ML/MIN/1.73M2 — SIGNIFICANT CHANGE UP
GLUCOSE SERPL-MCNC: 92 MG/DL — SIGNIFICANT CHANGE UP (ref 70–99)
HCT VFR BLD CALC: 34.9 % — SIGNIFICANT CHANGE UP (ref 34.5–45)
HGB BLD-MCNC: 11.1 G/DL — LOW (ref 11.5–15.5)
MCHC RBC-ENTMCNC: 30.1 PG — SIGNIFICANT CHANGE UP (ref 27–34)
MCHC RBC-ENTMCNC: 31.8 GM/DL — LOW (ref 32–36)
MCV RBC AUTO: 94.6 FL — SIGNIFICANT CHANGE UP (ref 80–100)
NRBC # BLD: 0 /100 WBCS — SIGNIFICANT CHANGE UP
NRBC # FLD: 0 K/UL — SIGNIFICANT CHANGE UP
PLATELET # BLD AUTO: 162 K/UL — SIGNIFICANT CHANGE UP (ref 150–400)
POTASSIUM SERPL-MCNC: 4.5 MMOL/L — SIGNIFICANT CHANGE UP (ref 3.5–5.3)
POTASSIUM SERPL-SCNC: 4.5 MMOL/L — SIGNIFICANT CHANGE UP (ref 3.5–5.3)
RBC # BLD: 3.69 M/UL — LOW (ref 3.8–5.2)
RBC # FLD: 13.7 % — SIGNIFICANT CHANGE UP (ref 10.3–14.5)
SODIUM SERPL-SCNC: 138 MMOL/L — SIGNIFICANT CHANGE UP (ref 135–145)
WBC # BLD: 3.64 K/UL — LOW (ref 3.8–10.5)
WBC # FLD AUTO: 3.64 K/UL — LOW (ref 3.8–10.5)

## 2022-04-08 RX ORDER — SODIUM CHLORIDE 9 MG/ML
1000 INJECTION, SOLUTION INTRAVENOUS
Refills: 0 | Status: DISCONTINUED | OUTPATIENT
Start: 2022-04-15 | End: 2022-04-29

## 2022-04-08 RX ORDER — LIDOCAINE AND PRILOCAINE CREAM 25; 25 MG/G; MG/G
0 CREAM TOPICAL
Qty: 0 | Refills: 0 | DISCHARGE

## 2022-04-08 RX ORDER — ACETAMINOPHEN 500 MG
2 TABLET ORAL
Qty: 0 | Refills: 0 | DISCHARGE

## 2022-04-08 RX ORDER — DENOSUMAB 60 MG/ML
60 INJECTION SUBCUTANEOUS
Qty: 0 | Refills: 0 | DISCHARGE

## 2022-04-08 RX ORDER — ASCORBIC ACID 60 MG
1 TABLET,CHEWABLE ORAL
Qty: 0 | Refills: 0 | DISCHARGE

## 2022-04-08 NOTE — H&P PST ADULT - NSICDXPASTSURGICALHX_GEN_ALL_CORE_FT
PAST SURGICAL HISTORY:  Bunion     S/P cholecystectomy     S/P removal of left ovary     S/P removal of right ovary      PAST SURGICAL HISTORY:  Bunion     S/P cholecystectomy     S/P removal of left ovary     S/P removal of right ovary     Umbilical mass excision umbilical  nodule

## 2022-04-08 NOTE — H&P PST ADULT - CORNEAL ABRASION RISK
[Dear  ___] : Dear  [unfilled], [Courtesy Letter:] : I had the pleasure of seeing your patient, [unfilled], in my office today. [Please see my note below.] : Please see my note below. [Sincerely,] : Sincerely, [FreeTextEntry2] : Dr. Freedom Fallon (Pulm/Ref) \par  Dr. Catarino Aguirre ([facility])  [FreeTextEntry3] : Arden Zuleta MD, FACS \par Chief, Division of Thoracic Surgery \par Director, Minimally Invasive Thoracic Surgery \par Department of Cardiovascular and Thoracic Surgery \par [facility] \par , Cardiovascular and Thoracic Surgery\par \par \par  daily eye drops

## 2022-04-08 NOTE — H&P PST ADULT - PROBLEM SELECTOR PROBLEM 2
Patient's mom was informed there is a referral put in for [facility] since 11/16/18 however [facility] did not reach out to patient and wondering if needs an order sent to them.  Please advise.   Hypertension

## 2022-04-08 NOTE — H&P PST ADULT - NSICDXPASTMEDICALHX_GEN_ALL_CORE_FT
PAST MEDICAL HISTORY:  Bunion     GERD (gastroesophageal reflux disease)     Glaucoma     Hypercholesteremia     Hypertension     Osteoporosis     Wrist fracture, right s/p fall 1/13     PAST MEDICAL HISTORY:  Bunion     GERD (gastroesophageal reflux disease)     Glaucoma     Hypercholesteremia     Hypertension     Osteoporosis     Right inguinal hernia     Wrist fracture, right s/p fall 1/13

## 2022-04-08 NOTE — H&P PST ADULT - GASTROINTESTINAL COMMENTS
Pt reports "abdominal pressure" at times.  Takes Dexilant prn.  Pt  reports right groin pain x1 year, worsening.  Dx with right inguinal hernia.  Scheduled for Laparoscopic inguinal hernia repair inguinal hernia (R) per dx

## 2022-04-08 NOTE — H&P PST ADULT - HISTORY OF PRESENT ILLNESS
70 y/o female reports right groin pain x1 year, worsening.  Dx with right inguinal hernia.  Scheduled for Laparoscopic inguinal hernia repair

## 2022-04-14 ENCOUNTER — TRANSCRIPTION ENCOUNTER (OUTPATIENT)
Age: 72
End: 2022-04-14

## 2022-04-14 NOTE — ASU PATIENT PROFILE, ADULT - SUPPORT PERSON PHONE
[FreeTextEntry1] : check blood work\par follow up with optometry/ophthalmology and dentist for routine exams (when due and able to go)\par follow up with GYN- has appointment scheduled\par c/w diet and exercise \par up to date with vaccinations \par  [phone]

## 2022-04-14 NOTE — ASU PATIENT PROFILE, ADULT - NSICDXPASTSURGICALHX_GEN_ALL_CORE_FT
PAST SURGICAL HISTORY:  Bunion     S/P cholecystectomy     S/P removal of left ovary     S/P removal of right ovary     Umbilical mass excision umbilical  nodule

## 2022-04-14 NOTE — ASU PATIENT PROFILE, ADULT - BARIATRIC
What Type Of Note Output Would You Prefer (Optional)?: Standard Output
How Severe Is Your Rash?: mild
Is This A New Presentation, Or A Follow-Up?: Rash
no

## 2022-04-14 NOTE — ASU PATIENT PROFILE, ADULT - NSICDXPASTMEDICALHX_GEN_ALL_CORE_FT
PAST MEDICAL HISTORY:  Bunion     GERD (gastroesophageal reflux disease)     Glaucoma     Hypercholesteremia     Hypertension     Osteoporosis     Right inguinal hernia     Wrist fracture, right s/p fall 1/13

## 2022-04-14 NOTE — ASU PATIENT PROFILE, ADULT - FALL HARM RISK - UNIVERSAL INTERVENTIONS
Bed in lowest position, wheels locked, appropriate side rails in place/Call bell, personal items and telephone in reach/Instruct patient to call for assistance before getting out of bed or chair/Non-slip footwear when patient is out of bed/[place] to call system/Physically safe environment - no spills, clutter or unnecessary equipment/Purposeful Proactive Rounding/Room/bathroom lighting operational, light cord in reach

## 2022-04-15 ENCOUNTER — APPOINTMENT (OUTPATIENT)
Dept: TRAUMA SURGERY | Facility: HOSPITAL | Age: 72
End: 2022-04-15

## 2022-04-15 ENCOUNTER — OUTPATIENT (OUTPATIENT)
Dept: OUTPATIENT SERVICES | Facility: HOSPITAL | Age: 72
LOS: 1 days | Discharge: HOME | End: 2022-04-15
Payer: MEDICARE

## 2022-04-15 ENCOUNTER — TRANSCRIPTION ENCOUNTER (OUTPATIENT)
Age: 72
End: 2022-04-15

## 2022-04-15 VITALS
SYSTOLIC BLOOD PRESSURE: 117 MMHG | WEIGHT: 108.03 LBS | TEMPERATURE: 98 F | HEIGHT: 59.25 IN | DIASTOLIC BLOOD PRESSURE: 55 MMHG | OXYGEN SATURATION: 99 % | RESPIRATION RATE: 15 BRPM | HEART RATE: 77 BPM

## 2022-04-15 VITALS
DIASTOLIC BLOOD PRESSURE: 66 MMHG | RESPIRATION RATE: 16 BRPM | OXYGEN SATURATION: 96 % | HEART RATE: 65 BPM | SYSTOLIC BLOOD PRESSURE: 119 MMHG

## 2022-04-15 DIAGNOSIS — R19.09 OTHER INTRA-ABDOMINAL AND PELVIC SWELLING, MASS AND LUMP: Chronic | ICD-10-CM

## 2022-04-15 DIAGNOSIS — Z90.721 ACQUIRED ABSENCE OF OVARIES, UNILATERAL: Chronic | ICD-10-CM

## 2022-04-15 DIAGNOSIS — K40.90 UNILATERAL INGUINAL HERNIA, WITHOUT OBSTRUCTION OR GANGRENE, NOT SPECIFIED AS RECURRENT: ICD-10-CM

## 2022-04-15 PROCEDURE — 49650 LAP ING HERNIA REPAIR INIT: CPT | Mod: GC

## 2022-04-15 DEVICE — TROCAR COVIDIEN SPACEMAKER PRO 10MM-12MM WITH DISSECTION BALLOON ROUND: Type: IMPLANTABLE DEVICE | Status: FUNCTIONAL

## 2022-04-15 DEVICE — MESH HERNIA INGUINAL PROGRIP LAPAROSCOPIC 15 X 10CM RIGHT: Type: IMPLANTABLE DEVICE | Status: FUNCTIONAL

## 2022-04-15 RX ORDER — LATANOPROST 0.05 MG/ML
0 SOLUTION/ DROPS OPHTHALMIC; TOPICAL
Qty: 0 | Refills: 5 | DISCHARGE

## 2022-04-15 RX ORDER — OXYCODONE HYDROCHLORIDE 5 MG/1
1 TABLET ORAL
Qty: 10 | Refills: 0
Start: 2022-04-15

## 2022-04-15 RX ORDER — OXYCODONE HYDROCHLORIDE 5 MG/1
5 TABLET ORAL EVERY 6 HOURS
Refills: 0 | Status: DISCONTINUED | OUTPATIENT
Start: 2022-04-15 | End: 2022-04-15

## 2022-04-15 RX ORDER — ONDANSETRON 8 MG/1
4 TABLET, FILM COATED ORAL ONCE
Refills: 0 | Status: COMPLETED | OUTPATIENT
Start: 2022-04-15 | End: 2022-04-15

## 2022-04-15 RX ORDER — ASCORBIC ACID 60 MG
1 TABLET,CHEWABLE ORAL
Qty: 0 | Refills: 0 | DISCHARGE

## 2022-04-15 RX ORDER — KETOROLAC TROMETHAMINE 30 MG/ML
15 SYRINGE (ML) INJECTION ONCE
Refills: 0 | Status: DISCONTINUED | OUTPATIENT
Start: 2022-04-15 | End: 2022-04-15

## 2022-04-15 RX ORDER — SODIUM CHLORIDE 9 MG/ML
500 INJECTION, SOLUTION INTRAVENOUS ONCE
Refills: 0 | Status: COMPLETED | OUTPATIENT
Start: 2022-04-15 | End: 2022-04-15

## 2022-04-15 RX ORDER — CHOLECALCIFEROL (VITAMIN D3) 125 MCG
1 CAPSULE ORAL
Qty: 0 | Refills: 0 | DISCHARGE

## 2022-04-15 RX ORDER — METOCLOPRAMIDE HCL 10 MG
10 TABLET ORAL ONCE
Refills: 0 | Status: COMPLETED | OUTPATIENT
Start: 2022-04-15 | End: 2022-04-15

## 2022-04-15 RX ORDER — DEXLANSOPRAZOLE 30 MG/1
0 CAPSULE, DELAYED RELEASE ORAL
Qty: 0 | Refills: 0 | DISCHARGE

## 2022-04-15 RX ORDER — ACETAMINOPHEN 500 MG
2 TABLET ORAL
Qty: 0 | Refills: 0 | DISCHARGE
Start: 2022-04-15

## 2022-04-15 RX ORDER — ACETAMINOPHEN 500 MG
650 TABLET ORAL EVERY 6 HOURS
Refills: 0 | Status: DISCONTINUED | OUTPATIENT
Start: 2022-04-15 | End: 2022-04-29

## 2022-04-15 RX ORDER — SIMETHICONE 80 MG/1
0 TABLET, CHEWABLE ORAL
Qty: 0 | Refills: 0 | DISCHARGE

## 2022-04-15 RX ORDER — L.ACIDOPH/B.ANIMALIS/B.LONGUM 15B CELL
1 CAPSULE ORAL
Qty: 0 | Refills: 0 | DISCHARGE

## 2022-04-15 RX ORDER — TIMOLOL 0.5 %
1 DROPS OPHTHALMIC (EYE)
Qty: 0 | Refills: 0 | DISCHARGE

## 2022-04-15 RX ORDER — OXYCODONE AND ACETAMINOPHEN 5; 325 MG/1; MG/1
1 TABLET ORAL ONCE
Refills: 0 | Status: DISCONTINUED | OUTPATIENT
Start: 2022-04-15 | End: 2022-04-15

## 2022-04-15 RX ORDER — ATORVASTATIN CALCIUM 80 MG/1
1 TABLET, FILM COATED ORAL
Qty: 0 | Refills: 0 | DISCHARGE

## 2022-04-15 RX ORDER — LOSARTAN POTASSIUM 100 MG/1
1 TABLET, FILM COATED ORAL
Qty: 0 | Refills: 0 | DISCHARGE

## 2022-04-15 RX ADMIN — ONDANSETRON 4 MILLIGRAM(S): 8 TABLET, FILM COATED ORAL at 16:37

## 2022-04-15 RX ADMIN — SODIUM CHLORIDE 1000 MILLILITER(S): 9 INJECTION, SOLUTION INTRAVENOUS at 20:31

## 2022-04-15 RX ADMIN — OXYCODONE HYDROCHLORIDE 5 MILLIGRAM(S): 5 TABLET ORAL at 18:09

## 2022-04-15 RX ADMIN — SODIUM CHLORIDE 30 MILLILITER(S): 9 INJECTION, SOLUTION INTRAVENOUS at 16:37

## 2022-04-15 RX ADMIN — Medication 10 MILLIGRAM(S): at 16:55

## 2022-04-15 RX ADMIN — Medication 15 MILLIGRAM(S): at 19:29

## 2022-04-15 RX ADMIN — Medication 15 MILLIGRAM(S): at 19:44

## 2022-04-15 RX ADMIN — SODIUM CHLORIDE 1000 MILLILITER(S): 9 INJECTION, SOLUTION INTRAVENOUS at 19:38

## 2022-04-15 NOTE — ASU DISCHARGE PLAN (ADULT/PEDIATRIC) - CARE PROVIDER_API CALL
Jess Michaud)  Critical Care Medicine; Surgery  [address] [place] [address]  Phone: [phone]  Fax: [phone]  Follow Up Time: 2 weeks

## 2022-04-15 NOTE — ASU DISCHARGE PLAN (ADULT/PEDIATRIC) - NURSING INSTRUCTIONS
No creams, lotions, powders  or ointments to incision site. Narcotic pain medication may cause nausea or constipation. Take medication with food. Increase fluids and fiber intake. Make appointment with MD.

## 2022-04-15 NOTE — ASU DISCHARGE PLAN (ADULT/PEDIATRIC) - ASU DC SPECIAL INSTRUCTIONSFT
Post-operative Instructions  1.	Wound dressing- There are white surgical tapes (called steri strips) over your incisions which are directly adherent to the skin.  These should remain on the skin, and will peel off naturally.  All of the stitches are “internal” and will dissolve naturally.    2.	Showering/Bathing- You may shower over the dressing the very next day after surgery.  Allow the water to run over the dressing, but do not scrub the area.  Do not sit in a bathtub or swimming pool for at least one week after surgery.    3.	Activity level- You may resume most normal daily activity as tolerated, but avoid strenuous activities such as aerobics, jogging, exercising or heavy lifting for at least 1 week after surgery.  You may return to work in 1-2 days after surgery.  You may drive as long as you are not taking any prescription pain medication.    4.	Pain Medication- You may take Tylenol and Motrin as prescribe on the bottle, up to every 6 hours. You may take the prescribed pain medication as needed for severe breakthrough pain.     5.	Follow-up Appointment- Please call the office to schedule your post-operative appointment which should be approximately 10 days after your surgery.     6.	Bruising/Bleeding/Swelling- It is normal for there to be some bruising and swelling at the site, and there may be some staining of blood on to the dressing.  Some discomfort at the surgical site is expected.  If your symptoms seem excessive, or if you have any question or concerns, please call the office.

## 2022-04-15 NOTE — ASU DISCHARGE PLAN (ADULT/PEDIATRIC) - NS MD DC FALL RISK RISK
For information on Fall & Injury Prevention, visit: [email or web address] OR  [email or web address] OR  https://www.cdc.gov/steadi/patient.html

## 2022-04-15 NOTE — BRIEF OPERATIVE NOTE - NSICDXBRIEFPOSTOP_GEN_ALL_CORE_FT
POST-OP DIAGNOSIS:  Unilateral inguinal hernia without obstruction or gangrene 15-Apr-2022 16:29:43  Wendy Lala  Unilateral femoral hernia without obstruction or gangrene 15-Apr-2022 16:30:36  Wendy Lala

## 2022-04-15 NOTE — BRIEF OPERATIVE NOTE - NSICDXBRIEFPREOP_GEN_ALL_CORE_FT
PRE-OP DIAGNOSIS:  Unilateral inguinal hernia without obstruction or gangrene 15-Apr-2022 16:29:40  Wendy Lala

## 2022-04-15 NOTE — BRIEF OPERATIVE NOTE - OPERATION/FINDINGS
laparoscopic TEP repair of right indirect inguinal hernia and small right femoral hernia with ProGrip mesh

## 2022-05-03 ENCOUNTER — APPOINTMENT (OUTPATIENT)
Dept: TRAUMA SURGERY | Facility: HOSPITAL | Age: 72
End: 2022-05-03
Payer: MEDICARE

## 2022-05-03 VITALS
HEART RATE: 89 BPM | DIASTOLIC BLOOD PRESSURE: 63 MMHG | BODY MASS INDEX: 19.69 KG/M2 | TEMPERATURE: 97.5 F | SYSTOLIC BLOOD PRESSURE: 114 MMHG | HEIGHT: 62 IN | WEIGHT: 107 LBS

## 2022-05-03 DIAGNOSIS — R30.0 DYSURIA: ICD-10-CM

## 2022-05-03 PROCEDURE — 99024 POSTOP FOLLOW-UP VISIT: CPT

## 2022-05-05 NOTE — HISTORY OF PRESENT ILLNESS
[de-identified] : 70yo F s/p laparoscopic TEP right inguinal hernia repair with mesh on 4/15 (direct and indirect hernias identified). PT reports mild, intermittent pain in R groin, unrelated to lifting or movement, gradually improving since surgery. She is tolerating a diet and having regular bowel movements. However, she reports dysuria since surgery, not improving. She denies frequency, urgency, fevers, chills, or back pain. She reports the swelling is improved and she no longer feels a bulge.

## 2022-05-05 NOTE — PHYSICAL EXAM
[Normal Breath Sounds] : Normal breath sounds [Normal Heart Sounds] : normal heart sounds [No Rash or Lesion] : No rash or lesion [Calm] : calm [de-identified] : NAD [de-identified] : Soft, nondistended, nontender, incisions c/d/i. No palpable hernia in R inguinal area.  [de-identified] : No deformities

## 2022-05-05 NOTE — ASSESSMENT
[FreeTextEntry1] : 72yo F s/p lap R inguinal hernia repair with mesh on 4/15/22. Pt recovering well except for persistent dysuria. Most likely urethral irritation from vuong catheter, however will obtain UA to r/o UTI.

## 2022-05-16 ENCOUNTER — NON-APPOINTMENT (OUTPATIENT)
Age: 72
End: 2022-05-16

## 2022-05-16 ENCOUNTER — APPOINTMENT (OUTPATIENT)
Dept: ORTHOPEDIC SURGERY | Facility: CLINIC | Age: 72
End: 2022-05-16
Payer: MEDICARE

## 2022-05-16 VITALS — BODY MASS INDEX: 19.69 KG/M2 | HEIGHT: 62 IN | WEIGHT: 107 LBS

## 2022-05-16 DIAGNOSIS — M53.3 SACROCOCCYGEAL DISORDERS, NOT ELSEWHERE CLASSIFIED: ICD-10-CM

## 2022-05-16 DIAGNOSIS — M54.50 LOW BACK PAIN, UNSPECIFIED: ICD-10-CM

## 2022-05-16 PROCEDURE — 72100 X-RAY EXAM L-S SPINE 2/3 VWS: CPT

## 2022-05-16 PROCEDURE — 99204 OFFICE O/P NEW MOD 45 MIN: CPT

## 2022-05-16 RX ORDER — MELOXICAM 15 MG/1
15 TABLET ORAL
Qty: 30 | Refills: 1 | Status: ACTIVE | COMMUNITY
Start: 2022-05-16 | End: 1900-01-01

## 2023-02-17 NOTE — ED ADULT NURSE NOTE - NSIMPLEMENTINTERV_GEN_ALL_ED
Implemented All Fall Risk Interventions:  [place] to call system. Call bell, personal items and telephone within reach. Instruct patient to call for assistance. Room bathroom lighting operational. Non-slip footwear when patient is off stretcher. Physically safe environment: no spills, clutter or unnecessary equipment. Stretcher in lowest position, wheels locked, appropriate side rails in place. Provide visual cue, wrist band, yellow gown, etc. Monitor gait and stability. Monitor for mental status changes and reorient to person, place, and time. Review medications for side effects contributing to fall risk. Reinforce activity limits and safety measures with patient and family. no

## 2023-04-11 NOTE — ASU PATIENT PROFILE, ADULT - PRESSURE ULCER(S)
Caller: Semaj Medina Osborne    Relationship: Self    Best call back number: [phone]      What was the call regarding: PATIENT CALLED STATED HE RECEIVED A CALL THAT WE NEEDED TO RESCHEDULE HIS APPOINTMENT FOR 4-18-23     Do you require a callback: YES           no

## 2023-05-23 NOTE — ASU PATIENT PROFILE, ADULT - TOBACCO USE
I know patient needs her yearly appointment, but when I call her and let her know that she needs an appointment are you okay with me sending a month in to get her until her appointment? Never smoker

## 2023-07-27 NOTE — ASU PREOP CHECKLIST - MEDICAL/PEDIATRIC CLEARANCE ON MEDICAL RECORD
[FreeTextEntry1] : He was advised to schedule an ENT consultation for the persistent cough and vomiting.  He was advised to eat Ensure puddings and to make sure that he is having gravy or mayonnaise with his food and to consider blending his food into a purée form.  He was advised to stay off of both the amlodipine and the losartan and to not take any antihypertensive medications at this time.  He remains totally disabled from work and will follow-up with all the specialist as scheduled and he will follow-up with myself in 1 month or sooner if needed.  A note extending his disability will be written today and forwarded to the disability company.
medical clearance on chart

## 2023-11-21 NOTE — H&P ADULT - NEGATIVE GENERAL SYMPTOMS
Gen: NAD, AOx3, able to make needs known, non-toxic  Head: NCAT  HEENT: EOMI, oral mucosa moist, normal conjunctiva  Lung: no respiratory distress, CTAB, no wheezes/rhonchi/rales B/L, speaking in full sentences  CV: RRR, no murmurs  Abd: non distended, soft, nontender, no guarding, no CVA tenderness  MSK: no visible deformities  Neuro: see NIHSS below  Skin: Warm, well perfused  Psych: normal affect no fever/no chills

## 2024-07-02 NOTE — ASU PATIENT PROFILE, ADULT - NSCAFFEINEWITH_GEN_ALL_CORE_SD
Patient complains of the following symptoms:   Lower right quadrant-worse when laying flat--pain is 7/10     How long have the symptoms occurred:   1 day    Have you or someone you have had contact with traveled outside the [place] in the past 14 days? no   If so what country? n/a    Was there an injury or accident: no   If so when   n/a    Pharmacy:   loaded    Patient phone number verified and updated in EPIC: yes    
Patient reports that she woke up at 0230am with right lower quadrant abdominal pain. Patient states that pain has worsened as the day goes on, reports that pain is worse when laying down (9/10), and 6/10 when not laying down. Patient reports pain is constant, \"undulating,\" and severe. Patient reports nausea with decreased appetite, and has had several soft bowel movements today. Patient has eaten a popsicle and has drank water \"here and there\" today. Patient denies fever, vomiting, chest pain, shortness of breath, or difficulty breathing. Writer recommended patient go to ER for further evaluation. Patient states she will consider it and discuss with , also requesting next available office appointment. Patient scheduled with coverage 7/3 9:20am.    Reason for Disposition   SEVERE abdominal pain (e.g., excruciating)    Protocols used: Abdominal Pain - Female-A-[place]    
none

## 2024-08-26 NOTE — PROGRESS NOTE ADULT - PROBLEM/PLAN-6
Render Risk Assessment In Note?: no Additional Notes: Pared with 15 blade. Recommend corn pads with SA. DISPLAY PLAN FREE TEXT Detail Level: Simple

## 2024-10-08 NOTE — PATIENT PROFILE ADULT - FUNCTIONAL ASSESSMENT - DAILY ACTIVITY ASSESSMENT TYPE
eLerted by bedside team. Request to change clear liquid diet to regular.  Confirmed with attending.  Order placed for consistent carb-regular diet. Admission

## 2025-01-03 NOTE — REASON FOR VISIT
Surgery Neurosurgery Palliative Care Neurology [Consultation] : a consultation visit [FreeTextEntry1] : R inguinal hernia  No lymphadedenopathy

## (undated) DEVICE — DRSG DERMABOND 0.7ML

## (undated) DEVICE — SOL IRR POUR H2O 500ML

## (undated) DEVICE — SUT SILK 2-0 30" SH

## (undated) DEVICE — BLADE SURGICAL #15 CARBON

## (undated) DEVICE — GLV 7.5 PROTEXIS (WHITE)

## (undated) DEVICE — VENODYNE/SCD SLEEVE CALF MEDIUM

## (undated) DEVICE — TUBING OLYMPUS INSUFFLATION

## (undated) DEVICE — ELCTR GROUNDING PAD ADULT COVIDIEN

## (undated) DEVICE — BASIN SET SINGLE

## (undated) DEVICE — SUT MONOCRYL 4-0 27" PS-2 UNDYED

## (undated) DEVICE — PROTECTOR HEEL / ELBOW FLUFFY

## (undated) DEVICE — SOL IRR POUR NS 0.9% 1000ML

## (undated) DEVICE — SUT SILK 0 24" SH DA

## (undated) DEVICE — POSITIONER STRAP ARMBOARD VELCRO TS-30

## (undated) DEVICE — DRAPE TOWEL BLUE 17" X 24"

## (undated) DEVICE — PACK GENERAL LAPAROSCOPY

## (undated) DEVICE — TUBING INSUFFLATION LAP FILTER 10FT

## (undated) DEVICE — D HELP - CLEARVIEW CLEARIFY SYSTEM

## (undated) DEVICE — CANISTER DISPOSABLE THIN WALL 3000CC

## (undated) DEVICE — GLV 7 PROTEXIS (WHITE)

## (undated) DEVICE — SUT VICRYL 0 27" UR-6

## (undated) DEVICE — ELCTR BOVIE TIP BLADE INSULATED 2.75" EDGE

## (undated) DEVICE — SHEARS HARMONIC ACE 5MM X 36CM CURVED TIP

## (undated) DEVICE — TROCAR COVIDIEN BLUNT TIP HASSAN 10MM

## (undated) DEVICE — DRAPE 3/4 SHEET 52X76"

## (undated) DEVICE — FOLEY TRAY 16FR 5CC LF UMETER CLOSED